# Patient Record
Sex: MALE | Race: BLACK OR AFRICAN AMERICAN | NOT HISPANIC OR LATINO | Employment: OTHER | URBAN - METROPOLITAN AREA
[De-identification: names, ages, dates, MRNs, and addresses within clinical notes are randomized per-mention and may not be internally consistent; named-entity substitution may affect disease eponyms.]

---

## 2023-05-19 ENCOUNTER — TELEPHONE (OUTPATIENT)
Dept: PHYSICAL MEDICINE AND REHAB | Facility: CLINIC | Age: 68
End: 2023-05-19
Payer: COMMERCIAL

## 2023-05-19 NOTE — TELEPHONE ENCOUNTER
[facility] Call Center    Phone Message    May a detailed message be left on voicemail: yes     Reason for Call: Other: pt asking about receiving a procedure with Dr. Mikey Hollis. - Radio Frequency ablation procedure.  Pt has questions re: this procedure. - and will pts insurance would cover procedure.  Pt is scheduled to have nerve block done on 05/23 in [facility] and asking if he should cancel apt - and get the radio frequency ablation done instead.  Pt is having this procedure in [place], [place] with Dr. Chun Crowley.  Pt asking for call back at [phone]      Action Taken: Message routed to:  [facility] ([facility] Dr. Mikey Hollis     Travel Screening: Not Applicable

## 2023-05-19 NOTE — TELEPHONE ENCOUNTER
"Phone call to patient to discuss. Patient reports he has low back issues as well as hip pain. Plan is for him to get a \"nerve block in [place] on Tuesday. Can I get that and still come to your clinic? Is that going to be a problem?\" Explained he could get the nerve block or steroid injection on Tuesday as he may not be jeffery to get an appointment [facility] for 1-2 weeks. Asked that patient have the records and images sent to out clinic. Also asked that he ask for a referral as some insurances require this. Contact information provided to [facility]. Once referral is received, he will be contacted to make his appointment. Encouraged pt to call nurse navigation line if he has not been contacted. Stated understanding and appreciation for call back.     Patient also asked if our clinic accepted Medicare and [employer] insurance. Explained that they have been accepted for other patients. He is encouraged to check with his insurance company too.   "

## 2023-06-14 ENCOUNTER — TRANSCRIBE ORDERS (OUTPATIENT)
Dept: OTHER | Age: 68
End: 2023-06-14

## 2023-06-14 DIAGNOSIS — M54.50 LOW BACK PAIN: Primary | ICD-10-CM
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
## 2023-07-12 PROBLEM — D86.0 SARCOIDOSIS OF LUNG (H): Status: ACTIVE | Noted: 2022-07-05

## 2023-07-12 PROBLEM — Z86.711 HISTORY OF PULMONARY EMBOLISM: Status: ACTIVE | Noted: 2021-06-30

## 2023-07-12 PROBLEM — L30.9 ECZEMA: Status: ACTIVE | Noted: 2018-01-15

## 2023-07-12 PROBLEM — K40.90 RIGHT INGUINAL HERNIA: Status: ACTIVE | Noted: 2023-01-12

## 2023-07-12 PROBLEM — J45.30 MILD PERSISTENT ASTHMA: Status: ACTIVE | Noted: 2022-07-05

## 2023-07-12 PROBLEM — M34.0: Status: ACTIVE | Noted: 2022-11-01

## 2023-07-12 PROBLEM — M25.512 ACUTE PAIN OF LEFT SHOULDER: Status: ACTIVE | Noted: 2020-04-30

## 2023-07-12 PROBLEM — R10.31 RIGHT GROIN PAIN: Status: ACTIVE | Noted: 2023-07-12

## 2023-07-12 PROBLEM — I15.9 SECONDARY HYPERTENSION: Status: ACTIVE | Noted: 2020-04-30

## 2023-07-12 PROBLEM — S86.811A RUPTURE OF RIGHT PATELLAR TENDON: Status: ACTIVE | Noted: 2017-06-08

## 2023-07-12 PROBLEM — M54.9 NOTALGIA: Status: ACTIVE | Noted: 2020-04-30

## 2023-07-12 PROBLEM — Z86.0101 H/O ADENOMATOUS POLYP OF COLON: Status: ACTIVE | Noted: 2020-10-29

## 2023-07-12 PROBLEM — G89.4 CHRONIC PAIN DISORDER: Status: ACTIVE | Noted: 2022-07-05

## 2023-07-12 PROBLEM — M19.049 ARTHRITIS PAIN, HAND: Status: ACTIVE | Noted: 2023-07-12

## 2023-07-12 PROBLEM — M50.30 DISC DISEASE, DEGENERATIVE, CERVICAL: Status: ACTIVE | Noted: 2022-07-05

## 2023-07-12 RX ORDER — VITAMIN B COMPLEX
1 TABLET ORAL DAILY
COMMUNITY

## 2023-07-12 RX ORDER — ZOLPIDEM TARTRATE 5 MG/1
5 TABLET ORAL
COMMUNITY
Start: 2023-01-03

## 2023-07-12 RX ORDER — ACETAMINOPHEN 325 MG/1
325-650 TABLET ORAL EVERY 6 HOURS PRN
COMMUNITY
Start: 2021-11-30

## 2023-07-12 RX ORDER — ALBUTEROL SULFATE 90 UG/1
2 AEROSOL, METERED RESPIRATORY (INHALATION) EVERY 6 HOURS PRN
COMMUNITY
Start: 2023-01-10

## 2023-07-12 RX ORDER — MELOXICAM 15 MG/1
7.5 TABLET ORAL DAILY PRN
Status: ON HOLD | COMMUNITY
Start: 2023-03-15 | End: 2024-01-03

## 2023-07-12 RX ORDER — ASPIRIN 81 MG/1
81 TABLET, CHEWABLE ORAL DAILY
COMMUNITY

## 2023-07-12 RX ORDER — AMLODIPINE BESYLATE 10 MG/1
10 TABLET ORAL
COMMUNITY
Start: 2022-11-01 | End: 2023-12-20

## 2023-07-12 RX ORDER — METOPROLOL TARTRATE 25 MG/1
12.5 TABLET, FILM COATED ORAL AT BEDTIME
COMMUNITY
Start: 2023-04-28

## 2023-07-12 RX ORDER — ATORVASTATIN CALCIUM 80 MG/1
80 TABLET, FILM COATED ORAL AT BEDTIME
COMMUNITY
Start: 2023-01-03

## 2023-07-12 RX ORDER — FLUTICASONE PROPIONATE 44 UG/1
1 AEROSOL, METERED RESPIRATORY (INHALATION) PRN
COMMUNITY
Start: 2021-11-30

## 2023-07-12 RX ORDER — PANTOPRAZOLE SODIUM 40 MG/1
40 TABLET, DELAYED RELEASE ORAL DAILY PRN
COMMUNITY
Start: 2023-01-03

## 2023-07-12 RX ORDER — TRIAMCINOLONE ACETONIDE 1 MG/G
CREAM TOPICAL
COMMUNITY
Start: 2023-06-20 | End: 2023-08-20

## 2023-07-12 RX ORDER — DULOXETIN HYDROCHLORIDE 20 MG/1
20 CAPSULE, DELAYED RELEASE ORAL PRN
COMMUNITY
Start: 2022-12-15

## 2023-07-12 RX ORDER — TRAZODONE HYDROCHLORIDE 100 MG/1
100 TABLET ORAL
COMMUNITY

## 2023-07-13 ENCOUNTER — OFFICE VISIT (OUTPATIENT)
Dept: PHYSICAL MEDICINE AND REHAB | Facility: CLINIC | Age: 68
End: 2023-07-13
Payer: COMMERCIAL

## 2023-07-13 VITALS
DIASTOLIC BLOOD PRESSURE: 78 MMHG | HEIGHT: 72 IN | SYSTOLIC BLOOD PRESSURE: 159 MMHG | WEIGHT: 220 LBS | BODY MASS INDEX: 29.8 KG/M2 | HEART RATE: 53 BPM

## 2023-07-13 DIAGNOSIS — M48.062 SPINAL STENOSIS OF LUMBAR REGION WITH NEUROGENIC CLAUDICATION: Primary | ICD-10-CM

## 2023-07-13 PROCEDURE — 99204 OFFICE O/P NEW MOD 45 MIN: CPT | Performed by: PHYSICIAN ASSISTANT

## 2023-07-13 RX ORDER — GABAPENTIN 300 MG/1
CAPSULE ORAL
Qty: 90 CAPSULE | Refills: 0 | Status: ON HOLD | OUTPATIENT
Start: 2023-07-13 | End: 2023-12-29

## 2023-07-13 ASSESSMENT — PAIN SCALES - GENERAL: PAINLEVEL: SEVERE PAIN (7)

## 2023-07-13 NOTE — PATIENT INSTRUCTIONS
[facility] Scheduling    Please call [phone] to schedule your image(s) (select option#1). There are 2 different locations, see below. You can do walk-in visits for xray only images if you want.     [facility]  [address] [place], [place] [address] [facility] [address]     Prescribed Gabapentin today, 300 mg tablets, to be titrated up to 1 tablets 3 times a day as tolerated for your nerve pain. Please follow Gabapentin dosing chart below.    Gabapentin 300mg Dosing Chart    DATE  MORNING AFTERNOON BEDTIME    Day 1 0 0 1    Day 2 0 0 1    Day 3 0 0 1    Day 4 1 0 1    Day 5 1 0 1    Day 6 1 0 1    Day 7 1 1 1    Day 8 1 1 1    Day 9 1 1 1                                                                                                                                   Continue medication, taking 1 capsules three times daily  Please call if you have any questions regarding how to take your medication
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
## 2023-07-18 ENCOUNTER — TRANSFERRED RECORDS (OUTPATIENT)
Dept: HEALTH INFORMATION MANAGEMENT | Facility: CLINIC | Age: 68
End: 2023-07-18
Payer: COMMERCIAL

## 2023-07-20 ENCOUNTER — TELEPHONE (OUTPATIENT)
Dept: PHYSICAL MEDICINE AND REHAB | Facility: CLINIC | Age: 68
End: 2023-07-20
Payer: COMMERCIAL

## 2023-07-20 NOTE — TELEPHONE ENCOUNTER
Per PSP Priscilla Harmon PA-C: Please call this patient and let him know that I reviewed the report of his MRI lumbar spine.  This shows moderate spinal stenosis at L3-4 and L5-S1.  There is also moderate narrowing around the nerves as they exit out of the spine at both of these levels.  I need to review the images in order to determine the best injection option.  I will have CSS request that the images get sent to me.      Phone call to patient to review results. Left message to return call.

## 2023-07-20 NOTE — TELEPHONE ENCOUNTER
Patient returned call. Results given and explained. Also explained that the PSP only received the report of MRI and is awaiting the actual images. Once she has reviewed the images, she will be able to determine the best injection option for him based on images and symptoms.     Patient did ask about surgery so this can be taken care of. Explained that neurosurgery referral is not made at this time and is usually made if patients are not responsive to conservative treatment or are having red flag symptoms. Stated understanding.

## 2023-07-25 ENCOUNTER — TELEPHONE (OUTPATIENT)
Dept: PHYSICAL MEDICINE AND REHAB | Facility: CLINIC | Age: 68
End: 2023-07-25
Payer: COMMERCIAL

## 2023-07-25 DIAGNOSIS — M54.16 LUMBAR RADICULITIS: Primary | ICD-10-CM

## 2023-07-25 NOTE — TELEPHONE ENCOUNTER
----- Message from Priscilla Harmon PA-C sent at 7/25/2023  8:24 AM CDT -----  Regarding: MRI review  Please call this patient and let him know that I reviewed his MRI lumbar spine.  I recommend bilateral L5-S1 transforaminal epidural steroid injections.  I am also happy to see the patient back in the clinic to review the results in person if he prefers.

## 2023-07-25 NOTE — TELEPHONE ENCOUNTER
Call placed to patient with provider's results and recommendations.  Pt stated understanding. Pt would like to proceed with injections. Injection order placed. Requirements reviewed. Pt on Eliquis. Transferred to scheduling to make appt.

## 2023-08-22 ENCOUNTER — RADIOLOGY INJECTION OFFICE VISIT (OUTPATIENT)
Dept: PHYSICAL MEDICINE AND REHAB | Facility: CLINIC | Age: 68
End: 2023-08-22
Attending: PHYSICIAN ASSISTANT
Payer: COMMERCIAL

## 2023-08-22 VITALS
OXYGEN SATURATION: 97 % | SYSTOLIC BLOOD PRESSURE: 132 MMHG | DIASTOLIC BLOOD PRESSURE: 82 MMHG | RESPIRATION RATE: 16 BRPM | HEART RATE: 58 BPM | TEMPERATURE: 97.8 F

## 2023-08-22 DIAGNOSIS — M54.16 LUMBAR RADICULITIS: ICD-10-CM

## 2023-08-22 PROCEDURE — 64483 NJX AA&/STRD TFRM EPI L/S 1: CPT | Mod: 50 | Performed by: PAIN MEDICINE

## 2023-08-22 RX ORDER — DEXAMETHASONE SODIUM PHOSPHATE 10 MG/ML
INJECTION, SOLUTION INTRAMUSCULAR; INTRAVENOUS
Status: COMPLETED | OUTPATIENT
Start: 2023-08-22 | End: 2023-08-22

## 2023-08-22 RX ORDER — LIDOCAINE HYDROCHLORIDE 10 MG/ML
INJECTION, SOLUTION EPIDURAL; INFILTRATION; INTRACAUDAL; PERINEURAL
Status: COMPLETED | OUTPATIENT
Start: 2023-08-22 | End: 2023-08-22

## 2023-08-22 RX ADMIN — LIDOCAINE HYDROCHLORIDE 4 ML: 10 INJECTION, SOLUTION EPIDURAL; INFILTRATION; INTRACAUDAL; PERINEURAL at 14:03

## 2023-08-22 RX ADMIN — DEXAMETHASONE SODIUM PHOSPHATE 10 MG: 10 INJECTION, SOLUTION INTRAMUSCULAR; INTRAVENOUS at 14:04

## 2023-08-22 ASSESSMENT — PAIN SCALES - GENERAL
PAINLEVEL: MODERATE PAIN (5)
PAINLEVEL: MILD PAIN (2)

## 2023-08-22 NOTE — PATIENT INSTRUCTIONS
Follow-up visit with CHRISTIE Bee in 2 weeks to discuss injection outcome and determine care plan going forward.       DISCHARGE INSTRUCTIONS    During office hours (8:00 a.m.- 4:00 p.m.) questions or concerns may be answered  by calling [facility] Navigation Nurses at  [phone].  Messages received after hours will be returned the following business day.      In the case of an emergency, please dial 911 or seek assistance at the nearest Emergency Room/Urgent Care facility.     All Patients:    You may experience an increase in your symptoms for the first 2 days (It may take anywhere between 2 days- 2 weeks for the steroid to have maximum effect).    You may use ice on the injection site, as frequently as 20 minutes each hour if needed.    You may take your pain medicine.    You may continue taking your regular medication after your injection. If you have had a Medial Branch Block you may resume pain medication once your pain diary is completed.    You may shower. No swimming, tub bath or hot tub for 48 hours.  You may remove your bandaid/bandage as soon as you are home.    You may resume light activities, as tolerated.    Resume your usual diet as tolerated.    It is strongly advised that you do not drive for 1-3 hours post injection.    If you have had oral sedation:  Do not drive for 8 hours post injection.      If you have had IV sedation:  Do not drive for 24 hours post injection.  Do not operate hazardous machinery or make important personal/business decisions for 24 hours.      POSSIBLE STEROID SIDE EFFECTS (If steroid/cortisone was used for your procedure)    -If you experience these symptoms, it should only last for a short period    Swelling of the legs              Skin redness (flushing)     Mouth (oral) irritation   Blood sugar (glucose) levels            Sweats                    Mood changes  Headache  Sleeplessness  Weakened immune system for up to 14 days, which could increase the risk of  kaylie the COVID-19 virus and/or experiencing more severe symptoms of the disease, if exposed.  Decreased effectiveness of the flu vaccine if given within 2 weeks of the steroid.         POSSIBLE PROCEDURE SIDE EFFECTS  -Call the Spine Center if you are concerned  Increased Pain           Increased numbness/tingling      Nausea/Vomiting          Bruising/bleeding at site      Redness or swelling                                              Difficulty walking      Weakness           Fever greater than 100.5    *In the event of a severe headache after an epidural steroid injection that is relieved by lying down, please call the [facility] to speak with a clinical staff member*

## 2023-09-11 ENCOUNTER — TELEPHONE (OUTPATIENT)
Dept: PHYSICAL MEDICINE AND REHAB | Facility: CLINIC | Age: 68
End: 2023-09-11
Payer: COMMERCIAL

## 2023-09-11 DIAGNOSIS — M48.062 SPINAL STENOSIS OF LUMBAR REGION WITH NEUROGENIC CLAUDICATION: Primary | ICD-10-CM

## 2023-09-11 NOTE — TELEPHONE ENCOUNTER
"PSP:  Priscilla Harmon PA-C   Last clinic visit:  7/13/23 OV; 8/22/23 Bilateral L5-S1 TFESIs  Reason for call: want referral to see neurosurgery  Clinical information:  Reports \"Need to have surgery now. The shot didn't do anything for the pinched nerve I got going on. The back feels OK, but I still have the leg symptoms. I can't walk a lot.\"   Advice given to patient: PSP or covering provider would be notified of request. He will be called back with recommended next steps is care. Per patient, detailed message ok upon call back.    Provider to address: Please advise    "

## 2023-09-11 NOTE — TELEPHONE ENCOUNTER
Phone call to patient to discuss. Left detailed message on dedicated voicemail. Encouraged to call the nurse line if he had questions regarding message left.

## 2023-09-11 NOTE — TELEPHONE ENCOUNTER
Please call and let the patient know that I have ordered a Neurosurgery consult for him.  He could also follow up with Priscilla Harmon to see if there are any other conservative measures if he would like.

## 2023-09-15 NOTE — PROGRESS NOTES
Raoul Henry is a 67 year old male who is being evaluated via a billable video visit.      How would you like to obtain your AVS? Mail a copy  Will anyone else be joining your video visit? No      Video Start Time: 8:43 AM  Assessment:   Raoul Henry is a 67 year old y.o. male with past medical history significant for  coronary artery disease, hypertension, asthma, blood clot, anxiety, depression, acid reflux  who presents today for follow-up regarding chronic bilateral low back pain with radiation into the bilateral lower extremities with limited walking tolerance.  My review of an MRI lumbar spine shows grade 1 degenerative spondylolisthesis at L5-S1 where there is moderate spinal stenosis and moderate bilateral foraminal stenosis.  At L3-4 there is a disc bulge and facet hypertrophy with joint effusions causing moderate spinal canal stenosis and moderate bilateral foraminal stenosis.  Patient is following up after bilateral L5-S1 transforaminal epidural steroid injections August 22, 2023 which provided relief of his back pain but no relief of his leg pain.       Plan:     A shared decision making plan was used.  The patient's values and choices were respected.  The following represents what was discussed and decided upon by the physician assistant and the patient.      1.  DIAGNOSTIC TESTS:  - Reviewed the MRI lumbar spine.  - I ordered lumbar spine flexion-extension x-rays.    2.  PHYSICAL THERAPY: Patient to physical therapy in fall 2022. He does his home exercises regularly. No additional physical therapy was ordered.     3.  MEDICATIONS: No changes are made to the patient's medications.  -Gabapentin 300 mg 3 times daily did not provide any relief of his pain so he stopped taking that.  - Patient takes duloxetine 20 mg daily.  - Patient takes Tylenol as needed.  - Patient has meloxicam available but takes it sparingly because it causes fluid retention.    4.  INTERVENTIONS:    - I offered for the patient to  trial bilateral L3-4 transforaminal epidural steroid injections to see if that is more effective than the bilateral L5-S1 transforaminal epidural steroid injections.  Patient declined.  He is interested in finding out if there is a surgical solution to his pain at this point.    5.  REFERRALS: I entered a referral for the patient to see Dr. Meyer with neurosurgery.    6.  FOLLOW-UP: Patient is going to follow-up with me as needed.  We will await recommendations from neurosurgery.  If he has questions or concerns in the meantime, he should not hesitate to call.    Subjective:     Raoul Henry is a 67 year old male who presents today for follow-up regarding chronic low back pain with radiation into the bilateral lower extremities.  Patient is following up after bilateral L5-S1 transforaminal epidural steroid injections August 22, 2023.  Patient reports improvement in his back pain since the injection but no improvement in his leg pain.    Patient complains of right-sided low back pain.  Pain radiates into the right buttock, down the posterior thigh, into the posterior calf.  He has numbness and tingling in the fourth and fifth toes of the right foot.  He feels a weak sensation in his legs.  Pain is aggravated with walking.  He states he has relief with his leg pain if he leans forward when he is walking.  That relief only last about 10 steps before the pain returns.  Pain is alleviated with sitting.  He rates his pain today as a 6 out of 10.  At its worst it is a 7 out of 10.  At its best it is a 5 out of 10.    Treatment to date:  - Physical therapy last fall.  He does his home exercises Regularly  - No chiropractic treatment  - No spine or hip surgeries  - Right hip joint injection about 2 months ago only gave relief for 1 to 2 days and had significant impact on blood sugar  - Bilateral L5-S1 transforaminal epidural steroid injections August 22, 2023 with relief of back pain, but no relief of leg pain  - Tylenol  as needed  - Meloxicam is helpful but causes fluid retention  - Gabapentin 300 mg 3 times daily did not provide any relief       Review of Systems:  Positive for numbness/tingling, weakness, headache.  Negative for loss of bowel/bladder control, inability to urinate, pain much worse at night, trip/stumble/falls, difficulty swallowing, difficulty with hand skills, fevers, unintentional weight loss.     Objective:   CONSTITUTIONAL:  Vital signs as above.  No acute distress.  The patient is well nourished and well groomed.    PSYCHIATRIC:  The patient is awake, alert, oriented to person, place and time.  The patient is answering questions appropriately with clear speech.  Normal affect.  HEENT: Normocephalic, atraumatic.  Sclera clear.    SKIN: Exposed skin is clean, dry, intact without rashes.  MUSCULOSKELETAL: Patient is observed sitting comfortably.     RESULTS: I reviewed the MRI lumbar spine from [facility] dated July 18, 2023.  At L3-4 there is a circumferential disc bulge and left greater than right facet hypertrophy with joint effusions causing moderate spinal canal stenosis and moderate left greater than right foraminal stenosis.  At L4-5 there is a disc bulge and facet arthropathy with mild to moderate spinal canal stenosis, mild right and moderate left foraminal stenosis.  At L5-S1 there is 5 mm degenerative anterolisthesis related to severe facet hypertrophy.  There is moderate bilateral foraminal stenosis.      Video-Visit Details    Type of service:  Video Visit    Video End Time (time video stopped): 8:52 AM  Originating Location (pt. Location): Daughter's house  Provider location: [facility] [address] [address]    Distant Location (provider location):  [facility] [place]     Platform used for Video Visit: Chelsea

## 2023-09-18 ENCOUNTER — VIRTUAL VISIT (OUTPATIENT)
Dept: PHYSICAL MEDICINE AND REHAB | Facility: CLINIC | Age: 68
End: 2023-09-18
Payer: COMMERCIAL

## 2023-09-18 DIAGNOSIS — M43.16 SPONDYLOLISTHESIS OF LUMBAR REGION: Primary | ICD-10-CM

## 2023-09-18 DIAGNOSIS — M48.062 SPINAL STENOSIS OF LUMBAR REGION WITH NEUROGENIC CLAUDICATION: ICD-10-CM

## 2023-09-18 PROCEDURE — 99214 OFFICE O/P EST MOD 30 MIN: CPT | Mod: VID | Performed by: PHYSICIAN ASSISTANT

## 2023-09-18 RX ORDER — TRIAMCINOLONE ACETONIDE 1 MG/G
CREAM TOPICAL 2 TIMES DAILY PRN
COMMUNITY
Start: 2023-08-03

## 2023-09-18 NOTE — PATIENT INSTRUCTIONS
A referral was entered to see neurosurgery at the [facility] Center.  They will call you to schedule an appointment.  Ifyou do not hear from them within 72 hrs, please call [phone] to schedule an appointment.

## 2023-10-02 ENCOUNTER — TELEPHONE (OUTPATIENT)
Dept: NEUROSURGERY | Facility: CLINIC | Age: 68
End: 2023-10-02
Payer: COMMERCIAL

## 2023-10-03 ENCOUNTER — HOSPITAL ENCOUNTER (OUTPATIENT)
Dept: RADIOLOGY | Facility: HOSPITAL | Age: 68
Discharge: HOME | End: 2023-10-03
Attending: SURGERY | Admitting: SURGERY
Payer: COMMERCIAL

## 2023-10-03 ENCOUNTER — OFFICE VISIT (OUTPATIENT)
Dept: NEUROSURGERY | Facility: CLINIC | Age: 68
End: 2023-10-03
Attending: PHYSICIAN ASSISTANT
Payer: COMMERCIAL

## 2023-10-03 VITALS — OXYGEN SATURATION: 96 % | HEART RATE: 72 BPM | SYSTOLIC BLOOD PRESSURE: 120 MMHG | DIASTOLIC BLOOD PRESSURE: 65 MMHG

## 2023-10-03 DIAGNOSIS — M48.062 SPINAL STENOSIS OF LUMBAR REGION WITH NEUROGENIC CLAUDICATION: ICD-10-CM

## 2023-10-03 DIAGNOSIS — M43.16 SPONDYLOLISTHESIS OF LUMBAR REGION: ICD-10-CM

## 2023-10-03 PROCEDURE — 72110 X-RAY EXAM L-2 SPINE 4/>VWS: CPT

## 2023-10-03 PROCEDURE — 99204 OFFICE O/P NEW MOD 45 MIN: CPT | Performed by: SURGERY

## 2023-10-03 ASSESSMENT — PAIN SCALES - GENERAL: PAINLEVEL: SEVERE PAIN (7)

## 2023-10-03 NOTE — PROGRESS NOTES
NEUROSURGERY CONSULTATION NOTE      Neurosurgery was asked to see this patient by Priscilla Harmon PA-C for evaluation of lumbar radiculopathy.       CONSULTATION ASSESSMENT AND PLAN:    66 yo male who presents with low back and bilateral leg pain. MRI of lumbar spine demonstrates moderate stenosis at lumbar 3-4 as well as lumbar 5-sacral 1. He also has moderate foraminal narrowing at lumbar 5-sacral 1 and lumbar 3-4. At lumbar 4-5 he has mild to moderate central stenosis with moderate left foraminal narrowing. In supine position he has 5 mm of anterolisthesis of lumbar 5-sacral 1. He was unable to obtain 4-view xray's prior to today's visit. Would recommend getting those for discussion of surgical options which would depend on the findings of the xray's. We will discuss the results on a telephone visit once those are obtained.      Yaneth Meyer MD      HISTORY OF PRESENTING ILLNESS:  66 yo male who presents with low back and bilateral leg pain. Symptoms present for last 10 years or so. Symptoms are present in posterior legs. Some tingling in his right toes. Legs feel weak. Symptoms can improve for 30 seconds if leans forward. He used to get more improvement with leaning forward. Sitting improves his symptoms. Denies bowel or bladder dysfunction.     Physical therapy- Does not feel he sees benefits. When he does therapy he feels he has a significant decrease in function following.   Recent 70 pounds of weight loss without relief of symptoms  Underwent right hip and bilateral L5-S1 TFESI without long term relief  Gabapentin, tylenol and Meloxicam- no long term relief.     PAST MEDICAL HISTORY:  Coronary artery disease  Hypertension  Asthma  Anxiety and depression  GERD  DVT    PAST SURGICAL HISTORY:  No prior lumbar surgery     REVIEW OF SYSTEMS:  ROS form under media    MEDICATIONS:    Current Outpatient Medications   Medication Sig Dispense Refill    acetaminophen (TYLENOL) 325 MG tablet Take 325-650 mg by  mouth      albuterol (PROAIR HFA/PROVENTIL HFA/VENTOLIN HFA) 108 (90 Base) MCG/ACT inhaler       amLODIPine (NORVASC) 10 MG tablet Take 10 mg by mouth      apixaban ANTICOAGULANT (ELIQUIS ANTICOAGULANT) 5 MG tablet       aspirin (ASA) 81 MG chewable tablet Take 81 mg by mouth      atorvastatin (LIPITOR) 80 MG tablet       diphenhydrAMINE (BENADRYL) 25 MG tablet Take 1 tablet by mouth every 6 hours as needed      DULoxetine (CYMBALTA) 20 MG capsule       fluticasone (FLOVENT HFA) 44 MCG/ACT inhaler   Inhale, bid, 0 Refill(s), Type: Maintenance      gabapentin (NEURONTIN) 300 MG capsule Take 1 capsule (300 mg) by mouth At Bedtime for 3 days, THEN 1 capsule (300 mg) 2 times daily for 3 days, THEN 1 capsule (300 mg) 3 times daily for 84 days. 90 capsule 0    meloxicam (MOBIC) 15 MG tablet Take 7.5 mg by mouth      metoprolol tartrate (LOPRESSOR) 25 MG tablet Take 12.5 mg by mouth      pantoprazole (PROTONIX) 40 MG EC tablet       traZODone (DESYREL) 100 MG tablet Take 100 mg by mouth      triamcinolone (KENALOG) 0.1 % external cream Apply topically 2 times daily      Vitamin D3 (CHOLECALCIFEROL) 25 mcg (1000 units) tablet Take 1 tablet by mouth daily      zolpidem (AMBIEN) 5 MG tablet Take 5 mg by mouth           ALLERGIES/SENSITIVITIES:     Allergies   Allergen Reactions    Amoxicillin Itching    Codeine Hives       PERTINENT SOCIAL HISTORY:     Socioeconomic History    Marital status: Single   Tobacco Use    Smoking status: Former     Types: Cigarettes    Smokeless tobacco: Never         FAMILY HISTORY:  No family history on file.     PHYSICAL EXAM:   Constitutional: /65   Pulse 72   SpO2 96%      Mental Status: A & O in no acute distress.  Affect is appropriate.  Speech is fluent.  Recent and remote memory are intact.  Attention span and concentration are normal.     Motor: Normal bulk and tone all muscle groups of upper and lower extremities.    Strength: 5/5 x 4     Sensory: Sensation intact bilaterally to  light touch.     Coordination: antalgic gait. Tatianna forward    Reflexes; supinator, biceps, triceps, knee/ ankle jerk intact 1+. No smiley's    IMAGING:  I personally reviewed all radiographic images         Cc:   No primary care provider on file.

## 2023-10-05 ENCOUNTER — VIRTUAL VISIT (OUTPATIENT)
Dept: NEUROSURGERY | Facility: CLINIC | Age: 68
End: 2023-10-05
Payer: COMMERCIAL

## 2023-10-05 DIAGNOSIS — M43.16 SPONDYLOLISTHESIS OF LUMBAR REGION: Primary | ICD-10-CM

## 2023-10-05 PROCEDURE — 99207 PR NO CHARGE LOS: CPT | Performed by: SURGERY

## 2023-10-05 NOTE — PROGRESS NOTES
68 yo male who presents with low back and bilateral leg pain. MRI of lumbar spine demonstrates moderate stenosis at lumbar 3-4 as well as lumbar 5-sacral 1. He also has moderate foraminal narrowing at lumbar 5-sacral 1 and lumbar 3-4. At lumbar 4-5 he has mild to moderate central stenosis with moderate left foraminal narrowing. In supine position he has 5 mm of anterolisthesis of lumbar 5-sacral 1. He was unable to obtain 4-view xray's prior to today's visit. Would recommend getting those for discussion of surgical options which would depend on the findings of the xray's. We will discuss the results on a telephone visit once those are obtained.      In the upright position on x-ray, patient develops anterolisthesis of both lumbar 3-4 and lumbar 4-5.  He continues to have grade 1 anterolisthesis at lumbar 5-sacral 1.  Although there does not appear to be dynamic instability between flexion-extension while upright since he develops anterolisthesis between the supine and upright position that suggests some segmental instability at lumbar 3-4 lumbar 4-5.  His symptoms are primarily claudicating in nature and likely due to his central stenosis at lumbar 3-4 lumbar 5-sacral 1.  Given these dynamic changes from supine to standing position would recommended instrumented fusion in addition to decompression of these levels.  Patient has a history of multiple chronic compression deformities.  No prior DEXA scan to assess bone quality.  Recommend proceeding with DEXA to rule out osteoporosis prior to proceeding with surgery.  He will follow-up after his DEXA scan.    Yaneth Meyer MD

## 2023-10-06 ENCOUNTER — TELEPHONE (OUTPATIENT)
Dept: NEUROSURGERY | Facility: CLINIC | Age: 68
End: 2023-10-06
Payer: COMMERCIAL

## 2023-10-06 NOTE — TELEPHONE ENCOUNTER
Health Call Center    Phone Message    May a detailed message be left on voicemail: yes     Reason for Call: Order(s): Other:   Reason for requested: Bone density to [employer]. [employer]'s phone# [phone]  Date needed: asap  Provider name: Yaneth Meyer    Please call patient back once done.    Action Taken: Other: [facility]    Travel Screening: Not Applicable

## 2023-10-09 ENCOUNTER — TELEPHONE (OUTPATIENT)
Dept: NEUROSURGERY | Facility: CLINIC | Age: 68
End: 2023-10-09
Payer: COMMERCIAL

## 2023-10-09 NOTE — TELEPHONE ENCOUNTER
[facility] Call Center    Phone Message    May a detailed message be left on voicemail: no     Reason for Call: [facility] didn't receive the order for dexa scan. Fax again to [phone]. Direct number to confirm they received it is [phone]  Patient would like c/b when done

## 2023-10-09 NOTE — TELEPHONE ENCOUNTER
Called number, no answer, [facility] imaging voicemail. Called hospital  and was given imaging department fax number.    Faxed  DEXA scan order  October 9, 2023 to fax number [phone]    Right Fax confirmed at 8:46 AM    Uzair Arora RN

## 2023-10-26 ENCOUNTER — MYC MEDICAL ADVICE (OUTPATIENT)
Dept: NEUROSURGERY | Facility: CLINIC | Age: 68
End: 2023-10-26
Payer: COMMERCIAL

## 2023-11-02 ENCOUNTER — PREP FOR PROCEDURE (OUTPATIENT)
Dept: NEUROSURGERY | Facility: CLINIC | Age: 68
End: 2023-11-02

## 2023-11-02 ENCOUNTER — VIRTUAL VISIT (OUTPATIENT)
Dept: NEUROSURGERY | Facility: CLINIC | Age: 68
End: 2023-11-02
Payer: COMMERCIAL

## 2023-11-02 DIAGNOSIS — M43.16 SPONDYLOLISTHESIS OF LUMBAR REGION: ICD-10-CM

## 2023-11-02 DIAGNOSIS — M53.2X6 LUMBAR SPINE INSTABILITY: Primary | ICD-10-CM

## 2023-11-02 DIAGNOSIS — M43.16 SPONDYLOLISTHESIS OF LUMBAR REGION: Primary | ICD-10-CM

## 2023-11-02 DIAGNOSIS — M53.2X6 SPINAL INSTABILITY OF LUMBAR REGION: ICD-10-CM

## 2023-11-02 DIAGNOSIS — M54.16 LUMBAR RADICULOPATHY: ICD-10-CM

## 2023-11-02 PROCEDURE — 99214 OFFICE O/P EST MOD 30 MIN: CPT | Performed by: SURGERY

## 2023-11-02 NOTE — PROGRESS NOTES
NEUROSURGERY FOLLOW UP  NOTE    Raoul Henry comes today in follow-up.  66 yo male who presents with low back and bilateral leg pain. MRI of lumbar spine demonstrates moderate stenosis at lumbar 3-4 as well as lumbar 5-sacral 1. He also has moderate foraminal narrowing at lumbar 5-sacral 1 and lumbar 3-4. At lumbar 4-5 he has mild to moderate central stenosis with moderate left foraminal narrowing. In supine position he has 5 mm of anterolisthesis of lumbar 5-sacral 1. He was unable to obtain 4-view xray's prior to today's visit. Would recommend getting those for discussion of surgical options which would depend on the findings of the xray's. We will discuss the results on a telephone visit once those are obtained.       In the upright position on x-ray, patient develops anterolisthesis of both lumbar 3-4 and lumbar 4-5.  He continues to have grade 1 anterolisthesis at lumbar 5-sacral 1.  Although there does not appear to be dynamic instability between flexion-extension while upright since he develops anterolisthesis between the supine and upright position that suggests some segmental instability at lumbar 3-4 lumbar 4-5. This was confirmed with radiology.  His symptoms are primarily claudicating in nature and likely due to his central stenosis at lumbar 3-4 lumbar 5-sacral 1.  Given these dynamic changes from supine to standing position would recommended instrumented fusion in addition to decompression of these levels.  Patient has a history of multiple chronic compression deformities.  No prior DEXA scan to assess bone quality.  Recommend proceeding with DEXA to rule out osteoporosis prior to proceeding with surgery.  He will follow-up after his DEXA scan.    DEXA showed osteopenia. Given multiple levels of dynamic instabilities in setting of central stenosis with foraminal narrowing, recommend lumbar 3-lumbar 4, lumbar 4-lumbar 5 and lumbar 5 sacral 1 anterior lumbar interbody fusion and posterior minimally  invasive instrumented fusion with use of stealth.  Risks and benefits were discussed in detail including but not limited to infection, hematoma, nerve damage including paralysis, post op radiculitis, durotomy, lack of a sold bone fusion, hardware malfunction, risks associated with the use of general anesthesia, blood clots in the lungs or legs. All questions answered and he agreed to proceed.    Yaneth Meyer MD      CC:     No primary care provider on file.  No primary provider on file.

## 2023-11-03 ENCOUNTER — TELEPHONE (OUTPATIENT)
Dept: NEUROSURGERY | Facility: CLINIC | Age: 68
End: 2023-11-03
Payer: COMMERCIAL

## 2023-11-03 NOTE — TELEPHONE ENCOUNTER
"Spoke to the patient, and offered to schedule surgery. After letting the patient know that he would need to call his insurance company and ask if  is covered under his insurance plan, the patient became hostile and told me that this part of the process is my job and continued to ask me \"are we clear\" and I responded \"no we are not clear this is part of the process we need to go through before we can start talking about dates\". I then offered to help and call the patient's insurance company for him and I asked \"do you happen to have your insurance card with you?' I was going to ask him for his customer care number, and call and figure out the  part of the process for him. I did see the provider number in his chart, but in the past when calling the provider number for [facility] I either was transfered to customer care or told to call and ask them.  The patient then said \"no the next person that calls me needs to be calling me with dates to schedule this surgery\". After that the patient hung up the phone.   "

## 2023-11-03 NOTE — TELEPHONE ENCOUNTER
Left a voice message with 's surgery scheduler Rebekah asking her to give me a call back to discuss surgery dates for the patient. Patient left a voice message on 11/3/23 at 9:42p.m asking for a call back to schedule surgery.

## 2023-11-06 NOTE — TELEPHONE ENCOUNTER
Spoke to the patient and let him know that I have left a voice message with 's office to return my call to coordinate dates for surgery, and that I would call him back after I speak with them. The patient verbalized understanding.

## 2023-11-06 NOTE — TELEPHONE ENCOUNTER
Left a voice message for Adeline Tyson's surgery scheduler to return the call to discuss coordinating the patient's surgery, Adeline is out until 11/7/23, and left her supervisor Judi's name and number on her voicemail. Left a voice mail for Judi to return the call to coordinate surgery for the patient.

## 2023-11-07 NOTE — TELEPHONE ENCOUNTER
Spoke to the patient and let him know that  isn't available until next year, and that I should have an answer about another provider assisting with the surgery by tomorrow 11/8/23.

## 2023-11-09 NOTE — TELEPHONE ENCOUNTER
Staff message from  asks for me to try scheduling with  a week before or the week after matti. Left a voice message with 's surgery scheduler Adeline to give me a call back. Left a voice message updating the patient as well.    No

## 2023-11-13 DIAGNOSIS — Z01.818 PREOP TESTING: Primary | ICD-10-CM

## 2023-11-13 NOTE — TELEPHONE ENCOUNTER
Patient is scheduled for surgery on 12/29/23. I gave the patient surgery details, and he verbalized understanding.

## 2023-12-20 ENCOUNTER — OFFICE VISIT (OUTPATIENT)
Dept: FAMILY MEDICINE | Facility: CLINIC | Age: 68
End: 2023-12-20
Payer: COMMERCIAL

## 2023-12-20 VITALS
WEIGHT: 208 LBS | BODY MASS INDEX: 28.21 KG/M2 | OXYGEN SATURATION: 98 % | HEART RATE: 60 BPM | DIASTOLIC BLOOD PRESSURE: 72 MMHG | RESPIRATION RATE: 20 BRPM | TEMPERATURE: 98.4 F | SYSTOLIC BLOOD PRESSURE: 130 MMHG

## 2023-12-20 DIAGNOSIS — Z01.818 PREOP TESTING: ICD-10-CM

## 2023-12-20 DIAGNOSIS — M43.16 SPONDYLOLISTHESIS OF LUMBAR REGION: ICD-10-CM

## 2023-12-20 DIAGNOSIS — Z86.711 HISTORY OF PULMONARY EMBOLISM: ICD-10-CM

## 2023-12-20 DIAGNOSIS — Z01.818 PREOP GENERAL PHYSICAL EXAM: Primary | ICD-10-CM

## 2023-12-20 DIAGNOSIS — I25.2 HISTORY OF MI (MYOCARDIAL INFARCTION): ICD-10-CM

## 2023-12-20 LAB
ANION GAP SERPL CALCULATED.3IONS-SCNC: 13 MMOL/L (ref 7–15)
APTT PPP: 34 SECONDS (ref 22–38)
BASOPHILS # BLD AUTO: 0 10E3/UL (ref 0–0.2)
BASOPHILS NFR BLD AUTO: 1 %
BUN SERPL-MCNC: 19.2 MG/DL (ref 8–23)
CALCIUM SERPL-MCNC: 10.5 MG/DL (ref 8.8–10.2)
CHLORIDE SERPL-SCNC: 102 MMOL/L (ref 98–107)
CREAT SERPL-MCNC: 1.11 MG/DL (ref 0.67–1.17)
DEPRECATED HCO3 PLAS-SCNC: 23 MMOL/L (ref 22–29)
EGFRCR SERPLBLD CKD-EPI 2021: 73 ML/MIN/1.73M2
EOSINOPHIL # BLD AUTO: 0.2 10E3/UL (ref 0–0.7)
EOSINOPHIL NFR BLD AUTO: 3 %
ERYTHROCYTE [DISTWIDTH] IN BLOOD BY AUTOMATED COUNT: 14.2 % (ref 10–15)
GLUCOSE SERPL-MCNC: 89 MG/DL (ref 70–99)
HCT VFR BLD AUTO: 42.1 % (ref 40–53)
HGB BLD-MCNC: 14.5 G/DL (ref 13.3–17.7)
IMM GRANULOCYTES # BLD: 0 10E3/UL
IMM GRANULOCYTES NFR BLD: 0 %
INR PPP: 1.24 (ref 0.85–1.15)
LYMPHOCYTES # BLD AUTO: 1.6 10E3/UL (ref 0.8–5.3)
LYMPHOCYTES NFR BLD AUTO: 31 %
MCH RBC QN AUTO: 30.9 PG (ref 26.5–33)
MCHC RBC AUTO-ENTMCNC: 34.4 G/DL (ref 31.5–36.5)
MCV RBC AUTO: 90 FL (ref 78–100)
MONOCYTES # BLD AUTO: 0.6 10E3/UL (ref 0–1.3)
MONOCYTES NFR BLD AUTO: 11 %
NEUTROPHILS # BLD AUTO: 2.8 10E3/UL (ref 1.6–8.3)
NEUTROPHILS NFR BLD AUTO: 54 %
PLATELET # BLD AUTO: 219 10E3/UL (ref 150–450)
POTASSIUM SERPL-SCNC: 3.8 MMOL/L (ref 3.4–5.3)
RBC # BLD AUTO: 4.7 10E6/UL (ref 4.4–5.9)
SODIUM SERPL-SCNC: 138 MMOL/L (ref 135–145)
VIT D+METAB SERPL-MCNC: 64 NG/ML (ref 20–50)
WBC # BLD AUTO: 5.3 10E3/UL (ref 4–11)

## 2023-12-20 PROCEDURE — 36415 COLL VENOUS BLD VENIPUNCTURE: CPT

## 2023-12-20 PROCEDURE — 93005 ELECTROCARDIOGRAM TRACING: CPT

## 2023-12-20 PROCEDURE — 80048 BASIC METABOLIC PNL TOTAL CA: CPT

## 2023-12-20 PROCEDURE — 85610 PROTHROMBIN TIME: CPT

## 2023-12-20 PROCEDURE — 82306 VITAMIN D 25 HYDROXY: CPT

## 2023-12-20 PROCEDURE — 85730 THROMBOPLASTIN TIME PARTIAL: CPT

## 2023-12-20 PROCEDURE — 99204 OFFICE O/P NEW MOD 45 MIN: CPT | Mod: 25

## 2023-12-20 PROCEDURE — 85025 COMPLETE CBC W/AUTO DIFF WBC: CPT

## 2023-12-20 RX ORDER — CLOBETASOL PROPIONATE 0.5 MG/G
OINTMENT TOPICAL PRN
COMMUNITY
Start: 2023-11-08

## 2023-12-20 ASSESSMENT — ASTHMA QUESTIONNAIRES: ACT_TOTALSCORE: 25

## 2023-12-20 ASSESSMENT — PAIN SCALES - GENERAL: PAINLEVEL: NO PAIN (0)

## 2023-12-20 NOTE — COMMUNITY RESOURCES LIST (ENGLISH)
12/20/2023   [facility] [employer]  N/A  For questions about this resource list or additional care needs, please contact your primary care clinic or care manager.  Phone: [phone]   Email: N/A   Address: [address] [place] [id number]   Hours: N/A        Financial Stability       Utility payment assistance  1  [employer] Distance: 0.19 miles      Phone/Virtual   [address] [place] [address]  Language: English  Hours: Mon - Fri 10:00 AM - 1:00 PM , Thu 5:00 PM - 6:30 PM  Fees: Free   Phone: (228) 557-4281 Email: Top[employer].source@[employer] Website: http://www.[employer].org     2  [employer] Distance: 5.43 miles      [address] [place] [address]  Language: English, Korean  Hours: Mon 9:30 AM - 11:30 AM , Tue 1:30 PM - 7:30 PM , Thu 9:00 AM - 7:00 PM , Fri 9:30 AM - 11:30 AM   Phone: (712) 136-1535 Email: info@[employer] Website: [email or web address].[email or web address]/          Important Numbers & Websites       Emergency Services   911  City Services   311  Poison Control   (216) 485-6438  Suicide Prevention Lifeline   (439) 640-9586 (TALK)  Child Abuse Hotline   (210) 410-8556 (4-A-Child)  Sexual Assault Hotline   (777) 145-8775 (HOPE)  National Runaway Safeline   (954) 743-2053 (RUNAWAY)  All-Options Talkline   (317) 947-2282  Substance Abuse Referral   (704) 718-2314 (HELP)

## 2023-12-20 NOTE — PATIENT INSTRUCTIONS
Preparing for Your Surgery  Getting started  A nurse will call you to review your health history and instructions. They will give you an arrival time based on your scheduled surgery time. Please be ready to share:  Your doctor's clinic name and phone number  Your medical, surgical, and anesthesia history  A list of allergies and sensitivities  A list of medicines, including herbal treatments and over-the-counter drugs  Whether the patient has a legal guardian (ask how to send us the papers in advance)  Please tell us if you're pregnant--or if there's any chance you might be pregnant. Some surgeries may injure a fetus (unborn baby), so they require a pregnancy test. Surgeries that are safe for a fetus don't always need a test, and you can choose whether to have one.   If you have a child who's having surgery, please ask for a copy of Preparing for Your Child's Surgery.    Preparing for surgery  Within 10 to 30 days of surgery: Have a pre-op exam (sometimes called an H&P, or History and Physical). This can be done at a clinic or pre-operative center.  If you're having a , you may not need this exam. Talk to your care team.  At your pre-op exam, talk to your care team about all medicines you take. If you need to stop any medicines before surgery, ask when to start taking them again.  We do this for your safety. Many medicines can make you bleed too much during surgery. Some change how well surgery (anesthesia) drugs work.  Call your insurance company to let them know you're having surgery. (If you don't have insurance, call [phone].)  Call your clinic if there's any change in your health. This includes signs of a cold or flu (sore throat, runny nose, cough, rash, fever). It also includes a scrape or scratch near the surgery site.  If you have questions on the day of surgery, call your hospital or surgery center.  Eating and drinking guidelines  For your safety: Unless your surgeon tells you otherwise,  follow the guidelines below.  Eat and drink as usual until 8 hours before you arrive for surgery. After that, no food or milk.  Drink clear liquids until 2 hours before you arrive. These are liquids you can see through, like water, Gatorade, and Propel Water. They also include plain black coffee and tea (no cream or milk), candy, and breath mints. You can spit out gum when you arrive.  If you drink alcohol: Stop drinking it the night before surgery.  If your care team tells you to take medicine on the morning of surgery, it's okay to take it with a sip of water.  Preventing infection  Shower or bathe the night before and morning of your surgery. Follow the instructions your clinic gave you. (If no instructions, use regular soap.)  Don't shave or clip hair near your surgery site. We'll remove the hair if needed.  Don't smoke or vape the morning of surgery. You may chew nicotine gum up to 2 hours before surgery. A nicotine patch is okay.  Note: Some surgeries require you to completely quit smoking and nicotine. Check with your surgeon.  Your care team will make every effort to keep you safe from infection. We will:  Clean our hands often with soap and water (or an alcohol-based hand rub).  Clean the skin at your surgery site with a special soap that kills germs.  Give you a special gown to keep you warm. (Cold raises the risk of infection.)  Wear special hair covers, masks, gowns and gloves during surgery.  Give antibiotic medicine, if prescribed. Not all surgeries need antibiotics.  What to bring on the day of surgery  Photo ID and insurance card  Copy of your health care directive, if you have one  Glasses and hearing aids (bring cases)  You can't wear contacts during surgery  Inhaler and eye drops, if you use them (tell us about these when you arrive)  CPAP machine or breathing device, if you use them  A few personal items, if spending the night  If you have . . .  A pacemaker, ICD (cardiac defibrillator) or other  implant: Bring the ID card.  An implanted stimulator: Bring the remote control.  A legal guardian: Bring a copy of the certified (court-stamped) guardianship papers.  Please remove any jewelry, including body piercings. Leave jewelry and other valuables at home.  If you're going home the day of surgery  You must have a responsible adult drive you home. They should stay with you overnight as well.  If you don't have someone to stay with you, and you aren't safe to go home alone, we may keep you overnight. Insurance often won't pay for this.  After surgery  If it's hard to control your pain or you need more pain medicine, please call your surgeon's office.  Questions?   If you have any questions for your care team, list them here: _________________________________________________________________________________________________________________________________________________________________________ ____________________________________ ____________________________________ ____________________________________  For informational purposes only. Not to replace the advice of your health care provider. Copyright   2003, 2019 [employer]). All rights reserved. Clinically reviewed by Yuko Burrows MD. [employer] 150280 - REV 12/22.    How to Take Your Medication Before Surgery    Antiplatelet or Anticoagulation Medication Instructions:   - aspirin: Discontinue aspirin 7-10 days prior to procedure to reduce bleeding risk. It should be resumed postoperatively. (STOP ON 12/22/23)    - apixaban (Eliquis), edoxaban (Savaysa), rivaroxaban (Xarelto): Bleeding risk is moderate or high for this procedure AND CrCl  (>=/) 50 mL/min. HOLD 2 days before surgery. (Stop on 12/27)    Additional Medication Instructions:   - Metoprolol: Continue taking on the day of surgery with a small sip of water.    - Atorvastatin: Continue taking the night before surgery.    - Continue your inhalers  - Ambien, Trazodone, pantoprazole, gabapentin,  Cymbalta you can continue.   - Aspirin, Vitamin D, meloxicam (Mobic), benadryl and over the counter vitamins or medications should also be stopped 12/22/2023

## 2023-12-20 NOTE — COMMUNITY RESOURCES LIST (ENGLISH)
12/20/2023   [facility] [employer]  N/A  For questions about this resource list or additional care needs, please contact your primary care clinic or care manager.  Phone: [phone]   Email: N/A   Address: [address] [place] [id number]   Hours: N/A        Financial Stability       Utility payment assistance  1  [employer] Distance: 0.19 miles      Phone/Virtual   [address] [place] [address]  Language: English  Hours: Mon - Fri 10:00 AM - 1:00 PM , Thu 5:00 PM - 6:30 PM  Fees: Free   Phone: (354) 226-9329 Email: [employer].source@[employer] Website: http://www.[employer].org     2  [employer] Distance: 5.43 miles      [address] [place] [address]  Language: English, Portuguese  Hours: Mon 9:30 AM - 11:30 AM , Tue 1:30 PM - 7:30 PM , Thu 9:00 AM - 7:00 PM , Fri 9:30 AM - 11:30 AM   Phone: (457) 317-5443 Email: info@[employer] Website: [email or web address]          Important Numbers & Websites       Emergency Services   911  City [facility]   311  Poison Control   (588) 203-4515  Suicide Prevention Lifeline   (802) 556-2938 (TALK)  Child Abuse Hotline   (154) 892-7474 (4-A-Child)  Sexual Assault Hotline   (681) 509-1432 (HOPE)  National Runaway Safeline   (326) 461-5329 (RUNAWAY)  All-Options Talkline   (143) 313-5083  Substance Abuse Referral   (449) 863-7129 (HELP)
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
## 2023-12-21 LAB
ATRIAL RATE - MUSE: 73 BPM
DIASTOLIC BLOOD PRESSURE - MUSE: NORMAL MMHG
INTERPRETATION ECG - MUSE: NORMAL
P AXIS - MUSE: -2 DEGREES
PR INTERVAL - MUSE: 150 MS
QRS DURATION - MUSE: 80 MS
QT - MUSE: 422 MS
QTC - MUSE: 464 MS
R AXIS - MUSE: -13 DEGREES
SYSTOLIC BLOOD PRESSURE - MUSE: NORMAL MMHG
T AXIS - MUSE: 3 DEGREES
VENTRICULAR RATE- MUSE: 73 BPM

## 2023-12-21 PROCEDURE — 93010 ELECTROCARDIOGRAM REPORT: CPT | Performed by: INTERNAL MEDICINE

## 2023-12-23 ENCOUNTER — HEALTH MAINTENANCE LETTER (OUTPATIENT)
Age: 68
End: 2023-12-23

## 2023-12-28 ENCOUNTER — ANESTHESIA EVENT (OUTPATIENT)
Dept: SURGERY | Facility: CLINIC | Age: 68
DRG: 460 | End: 2023-12-28
Payer: COMMERCIAL

## 2023-12-28 NOTE — TELEPHONE ENCOUNTER
LMTCB.      Reviewed H&P from 12/20/2023 - cleared for surgery  Labs, EKG - WNL (elevated INR, Dr. Meyer is aware)    MRI done on 07/18/2023 - in [place]    To OR as planned.       Sandra Thorpe, RN, CNRN

## 2023-12-29 ENCOUNTER — APPOINTMENT (OUTPATIENT)
Dept: SURGERY | Facility: PHYSICIAN GROUP | Age: 68
End: 2023-12-29
Payer: COMMERCIAL

## 2023-12-29 ENCOUNTER — HOSPITAL ENCOUNTER (INPATIENT)
Facility: CLINIC | Age: 68
LOS: 5 days | Discharge: HOME | DRG: 460 | End: 2024-01-03
Attending: SURGERY | Admitting: SURGERY
Payer: COMMERCIAL

## 2023-12-29 ENCOUNTER — APPOINTMENT (OUTPATIENT)
Dept: RADIOLOGY | Facility: CLINIC | Age: 68
DRG: 460 | End: 2023-12-29
Attending: PHYSICIAN ASSISTANT
Payer: COMMERCIAL

## 2023-12-29 ENCOUNTER — APPOINTMENT (OUTPATIENT)
Dept: RADIOLOGY | Facility: CLINIC | Age: 68
DRG: 460 | End: 2023-12-29
Attending: SURGERY
Payer: COMMERCIAL

## 2023-12-29 ENCOUNTER — ANESTHESIA (OUTPATIENT)
Dept: SURGERY | Facility: CLINIC | Age: 68
DRG: 460 | End: 2023-12-29
Payer: COMMERCIAL

## 2023-12-29 DIAGNOSIS — Z98.1 S/P LUMBAR FUSION: Primary | ICD-10-CM

## 2023-12-29 DIAGNOSIS — T83.511A URINARY TRACT INFECTION ASSOCIATED WITH INDWELLING URETHRAL CATHETER, INITIAL ENCOUNTER (H): ICD-10-CM

## 2023-12-29 DIAGNOSIS — N39.0 URINARY TRACT INFECTION ASSOCIATED WITH INDWELLING URETHRAL CATHETER, INITIAL ENCOUNTER (H): ICD-10-CM

## 2023-12-29 LAB
ABO/RH(D): NORMAL
ANTIBODY SCREEN: NEGATIVE
BLD PROD TYP BPU: NORMAL
BLD PROD TYP BPU: NORMAL
BLOOD COMPONENT TYPE: NORMAL
BLOOD COMPONENT TYPE: NORMAL
CODING SYSTEM: NORMAL
CODING SYSTEM: NORMAL
CROSSMATCH: NORMAL
CROSSMATCH: NORMAL
ERYTHROCYTE [DISTWIDTH] IN BLOOD BY AUTOMATED COUNT: 14 % (ref 10–15)
ERYTHROCYTE [DISTWIDTH] IN BLOOD BY AUTOMATED COUNT: 14.1 % (ref 10–15)
GLUCOSE BLDC GLUCOMTR-MCNC: 91 MG/DL (ref 70–99)
HCT VFR BLD AUTO: 32.9 % (ref 40–53)
HCT VFR BLD AUTO: 37.6 % (ref 40–53)
HGB BLD-MCNC: 11 G/DL (ref 13.3–17.7)
HGB BLD-MCNC: 12.6 G/DL (ref 13.3–17.7)
MCH RBC QN AUTO: 30 PG (ref 26.5–33)
MCH RBC QN AUTO: 30 PG (ref 26.5–33)
MCHC RBC AUTO-ENTMCNC: 33.4 G/DL (ref 31.5–36.5)
MCHC RBC AUTO-ENTMCNC: 33.5 G/DL (ref 31.5–36.5)
MCV RBC AUTO: 90 FL (ref 78–100)
MCV RBC AUTO: 90 FL (ref 78–100)
PLATELET # BLD AUTO: 155 10E3/UL (ref 150–450)
PLATELET # BLD AUTO: 195 10E3/UL (ref 150–450)
RBC # BLD AUTO: 3.67 10E6/UL (ref 4.4–5.9)
RBC # BLD AUTO: 4.2 10E6/UL (ref 4.4–5.9)
SPECIMEN EXPIRATION DATE: NORMAL
UNIT ABO/RH: NORMAL
UNIT ABO/RH: NORMAL
UNIT NUMBER: NORMAL
UNIT NUMBER: NORMAL
UNIT STATUS: NORMAL
UNIT STATUS: NORMAL
UNIT TYPE ISBT: 6200
UNIT TYPE ISBT: 6200
WBC # BLD AUTO: 8.9 10E3/UL (ref 4–11)
WBC # BLD AUTO: 9.5 10E3/UL (ref 4–11)

## 2023-12-29 PROCEDURE — L8699 PROSTHETIC IMPLANT NOS: HCPCS | Performed by: SURGERY

## 2023-12-29 PROCEDURE — 250N000025 HC SEVOFLURANE, PER MIN: Performed by: SURGERY

## 2023-12-29 PROCEDURE — 258N000003 HC RX IP 258 OP 636: Performed by: NURSE ANESTHETIST, CERTIFIED REGISTERED

## 2023-12-29 PROCEDURE — 86923 COMPATIBILITY TEST ELECTRIC: CPT | Performed by: ANESTHESIOLOGY

## 2023-12-29 PROCEDURE — 999N000063 XR ABDOMEN PORT 1 VIEW

## 2023-12-29 PROCEDURE — 85014 HEMATOCRIT: CPT | Performed by: SURGERY

## 2023-12-29 PROCEDURE — C1762 CONN TISS, HUMAN(INC FASCIA): HCPCS | Performed by: SURGERY

## 2023-12-29 PROCEDURE — P9045 ALBUMIN (HUMAN), 5%, 250 ML: HCPCS | Mod: JZ | Performed by: NURSE ANESTHETIST, CERTIFIED REGISTERED

## 2023-12-29 PROCEDURE — C1713 ANCHOR/SCREW BN/BN,TIS/BN: HCPCS | Performed by: SURGERY

## 2023-12-29 PROCEDURE — 250N000009 HC RX 250: Performed by: NURSE ANESTHETIST, CERTIFIED REGISTERED

## 2023-12-29 PROCEDURE — 0QH004Z INSERTION OF INTERNAL FIXATION DEVICE INTO LUMBAR VERTEBRA, OPEN APPROACH: ICD-10-PCS | Performed by: SURGERY

## 2023-12-29 PROCEDURE — 360N000086 HC SURGERY LEVEL 6 W/ FLUORO, PER MIN: Performed by: SURGERY

## 2023-12-29 PROCEDURE — 250N000011 HC RX IP 250 OP 636: Performed by: ANESTHESIOLOGY

## 2023-12-29 PROCEDURE — 258N000003 HC RX IP 258 OP 636: Performed by: SURGERY

## 2023-12-29 PROCEDURE — 250N000013 HC RX MED GY IP 250 OP 250 PS 637: Performed by: ANESTHESIOLOGY

## 2023-12-29 PROCEDURE — 250N000011 HC RX IP 250 OP 636: Performed by: NURSE ANESTHETIST, CERTIFIED REGISTERED

## 2023-12-29 PROCEDURE — 85027 COMPLETE CBC AUTOMATED: CPT

## 2023-12-29 PROCEDURE — 99222 1ST HOSP IP/OBS MODERATE 55: CPT | Performed by: STUDENT IN AN ORGANIZED HEALTH CARE EDUCATION/TRAINING PROGRAM

## 2023-12-29 PROCEDURE — 0SB20ZZ EXCISION OF LUMBAR VERTEBRAL DISC, OPEN APPROACH: ICD-10-PCS | Performed by: SURGERY

## 2023-12-29 PROCEDURE — 272N000001 HC OR GENERAL SUPPLY STERILE: Performed by: SURGERY

## 2023-12-29 PROCEDURE — 999N000182 XR SURGERY CARM FLUORO GREATER THAN 5 MIN

## 2023-12-29 PROCEDURE — 120N000001 HC R&B MED SURG/OB

## 2023-12-29 PROCEDURE — 710N000010 HC RECOVERY PHASE 1, LEVEL 2, PER MIN: Performed by: SURGERY

## 2023-12-29 PROCEDURE — 258N000003 HC RX IP 258 OP 636: Performed by: ANESTHESIOLOGY

## 2023-12-29 PROCEDURE — 999N000182 XR SURGERY CARM FLUORO GREATER THAN 5 MIN: Mod: TC

## 2023-12-29 PROCEDURE — 0SG30A0 FUSION OF LUMBOSACRAL JOINT WITH INTERBODY FUSION DEVICE, ANTERIOR APPROACH, ANTERIOR COLUMN, OPEN APPROACH: ICD-10-PCS | Performed by: SURGERY

## 2023-12-29 PROCEDURE — 250N000011 HC RX IP 250 OP 636: Performed by: PHYSICIAN ASSISTANT

## 2023-12-29 PROCEDURE — 0QH104Z INSERTION OF INTERNAL FIXATION DEVICE INTO SACRUM, OPEN APPROACH: ICD-10-PCS | Performed by: SURGERY

## 2023-12-29 PROCEDURE — 999N000141 HC STATISTIC PRE-PROCEDURE NURSING ASSESSMENT: Performed by: SURGERY

## 2023-12-29 PROCEDURE — C1760 CLOSURE DEV, VASC: HCPCS | Performed by: SURGERY

## 2023-12-29 PROCEDURE — 250N000013 HC RX MED GY IP 250 OP 250 PS 637: Performed by: PHYSICIAN ASSISTANT

## 2023-12-29 PROCEDURE — 370N000017 HC ANESTHESIA TECHNICAL FEE, PER MIN: Performed by: SURGERY

## 2023-12-29 PROCEDURE — 258N000003 HC RX IP 258 OP 636

## 2023-12-29 PROCEDURE — 0W3H0ZZ CONTROL BLEEDING IN RETROPERITONEUM, OPEN APPROACH: ICD-10-PCS | Performed by: SURGERY

## 2023-12-29 PROCEDURE — 86900 BLOOD TYPING SEROLOGIC ABO: CPT | Performed by: SURGERY

## 2023-12-29 PROCEDURE — 250N000011 HC RX IP 250 OP 636

## 2023-12-29 PROCEDURE — 250N000011 HC RX IP 250 OP 636: Performed by: SURGERY

## 2023-12-29 PROCEDURE — 250N000009 HC RX 250: Performed by: SURGERY

## 2023-12-29 PROCEDURE — 0SB40ZZ EXCISION OF LUMBOSACRAL DISC, OPEN APPROACH: ICD-10-PCS | Performed by: SURGERY

## 2023-12-29 PROCEDURE — 272N000004 HC RX 272: Performed by: SURGERY

## 2023-12-29 PROCEDURE — 0SG10A0 FUSION OF 2 OR MORE LUMBAR VERTEBRAL JOINTS WITH INTERBODY FUSION DEVICE, ANTERIOR APPROACH, ANTERIOR COLUMN, OPEN APPROACH: ICD-10-PCS | Performed by: SURGERY

## 2023-12-29 PROCEDURE — 8E0WXBF COMPUTER ASSISTED PROCEDURE OF TRUNK REGION, WITH FLUOROSCOPY: ICD-10-PCS | Performed by: SURGERY

## 2023-12-29 PROCEDURE — 250N000013 HC RX MED GY IP 250 OP 250 PS 637

## 2023-12-29 PROCEDURE — 36415 COLL VENOUS BLD VENIPUNCTURE: CPT

## 2023-12-29 DEVICE — IMP SCR PEDICLE MEDT VOYAGER 6.5X45MM 54850016545: Type: IMPLANTABLE DEVICE | Site: SPINE LUMBAR | Status: FUNCTIONAL

## 2023-12-29 DEVICE — GRAFT BONE INFUSE BMP LG 7510600: Type: IMPLANTABLE DEVICE | Site: ABDOMEN | Status: FUNCTIONAL

## 2023-12-29 DEVICE — SCREW BN 25MM 5.5MM NS ENDOSKELETON TAS SPNE INTRBD FS: Type: IMPLANTABLE DEVICE | Site: ABDOMEN | Status: FUNCTIONAL

## 2023-12-29 DEVICE — KIT BNGF 6CC DMNR CORT FBR ACCELERATE GRFTN CNN T50206: Type: IMPLANTABLE DEVICE | Site: ABDOMEN | Status: FUNCTIONAL

## 2023-12-29 DEVICE — IMPLANTABLE DEVICE: Type: IMPLANTABLE DEVICE | Site: SPINE LUMBAR | Status: FUNCTIONAL

## 2023-12-29 DEVICE — INTERBODY FUSION DEVICE  7 DEGREE STANDARD 14MM
Type: IMPLANTABLE DEVICE | Site: ABDOMEN | Status: FUNCTIONAL
Brand: ENDOSKELETON® TAS NANOLOCK® SURFACE TECHNOLOGY

## 2023-12-29 DEVICE — IMP SCR PEDICLE MEDT VOYAGER 6.5X50MM 54850016550: Type: IMPLANTABLE DEVICE | Site: SPINE LUMBAR | Status: FUNCTIONAL

## 2023-12-29 DEVICE — SCREW BN 40MM 6.5MM MA CNN XTD TAB NS SOLERA CD HZN SPNE: Type: IMPLANTABLE DEVICE | Site: SPINE LUMBAR | Status: FUNCTIONAL

## 2023-12-29 DEVICE — IMP SCR MEDT VOYAGER 4.75MM 6440530: Type: IMPLANTABLE DEVICE | Site: SPINE LUMBAR | Status: FUNCTIONAL

## 2023-12-29 RX ORDER — NALOXONE HYDROCHLORIDE 0.4 MG/ML
0.2 INJECTION, SOLUTION INTRAMUSCULAR; INTRAVENOUS; SUBCUTANEOUS
Status: DISCONTINUED | OUTPATIENT
Start: 2023-12-29 | End: 2024-01-03 | Stop reason: HOSPADM

## 2023-12-29 RX ORDER — CEFAZOLIN SODIUM/WATER 2 G/20 ML
2 SYRINGE (ML) INTRAVENOUS SEE ADMIN INSTRUCTIONS
Status: DISCONTINUED | OUTPATIENT
Start: 2023-12-29 | End: 2023-12-29 | Stop reason: HOSPADM

## 2023-12-29 RX ORDER — OXYCODONE HYDROCHLORIDE 5 MG/1
10 TABLET ORAL EVERY 4 HOURS PRN
Status: DISCONTINUED | OUTPATIENT
Start: 2023-12-29 | End: 2024-01-03 | Stop reason: HOSPADM

## 2023-12-29 RX ORDER — LIDOCAINE HYDROCHLORIDE 10 MG/ML
INJECTION, SOLUTION INFILTRATION; PERINEURAL PRN
Status: DISCONTINUED | OUTPATIENT
Start: 2023-12-29 | End: 2023-12-29

## 2023-12-29 RX ORDER — CELECOXIB 200 MG/1
200 CAPSULE ORAL ONCE
Status: COMPLETED | OUTPATIENT
Start: 2023-12-29 | End: 2023-12-29

## 2023-12-29 RX ORDER — FENTANYL CITRATE 50 UG/ML
INJECTION, SOLUTION INTRAMUSCULAR; INTRAVENOUS PRN
Status: DISCONTINUED | OUTPATIENT
Start: 2023-12-29 | End: 2023-12-29

## 2023-12-29 RX ORDER — SODIUM CHLORIDE 9 MG/ML
INJECTION, SOLUTION INTRAVENOUS CONTINUOUS PRN
Status: DISCONTINUED | OUTPATIENT
Start: 2023-12-29 | End: 2023-12-29

## 2023-12-29 RX ORDER — ACETAMINOPHEN 325 MG/1
975 TABLET ORAL EVERY 8 HOURS
Qty: 27 TABLET | Refills: 0 | Status: COMPLETED | OUTPATIENT
Start: 2023-12-29 | End: 2024-01-01

## 2023-12-29 RX ORDER — GABAPENTIN 100 MG/1
100 CAPSULE ORAL
Status: COMPLETED | OUTPATIENT
Start: 2023-12-29 | End: 2023-12-29

## 2023-12-29 RX ORDER — ACETAMINOPHEN 325 MG/1
975 TABLET ORAL ONCE
Status: COMPLETED | OUTPATIENT
Start: 2023-12-29 | End: 2023-12-29

## 2023-12-29 RX ORDER — SODIUM CHLORIDE 9 MG/ML
INJECTION, SOLUTION INTRAVENOUS CONTINUOUS
Status: DISCONTINUED | OUTPATIENT
Start: 2023-12-29 | End: 2024-01-03 | Stop reason: HOSPADM

## 2023-12-29 RX ORDER — LORATADINE 10 MG/1
10 TABLET ORAL DAILY PRN
Status: DISCONTINUED | OUTPATIENT
Start: 2023-12-29 | End: 2024-01-03 | Stop reason: HOSPADM

## 2023-12-29 RX ORDER — PROPOFOL 10 MG/ML
INJECTION, EMULSION INTRAVENOUS PRN
Status: DISCONTINUED | OUTPATIENT
Start: 2023-12-29 | End: 2023-12-29

## 2023-12-29 RX ORDER — ATORVASTATIN CALCIUM 40 MG/1
80 TABLET, FILM COATED ORAL AT BEDTIME
Status: DISCONTINUED | OUTPATIENT
Start: 2023-12-29 | End: 2024-01-03 | Stop reason: HOSPADM

## 2023-12-29 RX ORDER — ONDANSETRON 2 MG/ML
4 INJECTION INTRAMUSCULAR; INTRAVENOUS EVERY 30 MIN PRN
Status: CANCELLED | OUTPATIENT
Start: 2023-12-29

## 2023-12-29 RX ORDER — POLYETHYLENE GLYCOL 3350 17 G/17G
17 POWDER, FOR SOLUTION ORAL DAILY
Status: DISCONTINUED | OUTPATIENT
Start: 2023-12-30 | End: 2024-01-03 | Stop reason: HOSPADM

## 2023-12-29 RX ORDER — OXYCODONE HYDROCHLORIDE 5 MG/1
5 TABLET ORAL EVERY 4 HOURS PRN
Status: DISCONTINUED | OUTPATIENT
Start: 2023-12-29 | End: 2024-01-03 | Stop reason: HOSPADM

## 2023-12-29 RX ORDER — ONDANSETRON 4 MG/1
4 TABLET, ORALLY DISINTEGRATING ORAL EVERY 30 MIN PRN
Status: DISCONTINUED | OUTPATIENT
Start: 2023-12-29 | End: 2023-12-29 | Stop reason: HOSPADM

## 2023-12-29 RX ORDER — ONDANSETRON 2 MG/ML
INJECTION INTRAMUSCULAR; INTRAVENOUS PRN
Status: DISCONTINUED | OUTPATIENT
Start: 2023-12-29 | End: 2023-12-29

## 2023-12-29 RX ORDER — CALCIUM CHLORIDE 100 MG/ML
INJECTION INTRAVENOUS; INTRAVENTRICULAR PRN
Status: DISCONTINUED | OUTPATIENT
Start: 2023-12-29 | End: 2023-12-29

## 2023-12-29 RX ORDER — LIDOCAINE 40 MG/G
CREAM TOPICAL
Status: DISCONTINUED | OUTPATIENT
Start: 2023-12-29 | End: 2023-12-29 | Stop reason: HOSPADM

## 2023-12-29 RX ORDER — HYDROMORPHONE HCL IN WATER/PF 6 MG/30 ML
0.4 PATIENT CONTROLLED ANALGESIA SYRINGE INTRAVENOUS
Status: DISCONTINUED | OUTPATIENT
Start: 2023-12-29 | End: 2024-01-03 | Stop reason: HOSPADM

## 2023-12-29 RX ORDER — MAGNESIUM SULFATE 4 G/50ML
4 INJECTION INTRAVENOUS ONCE
Status: COMPLETED | OUTPATIENT
Start: 2023-12-29 | End: 2023-12-29

## 2023-12-29 RX ORDER — CEFAZOLIN SODIUM/WATER 2 G/20 ML
2 SYRINGE (ML) INTRAVENOUS
Status: COMPLETED | OUTPATIENT
Start: 2023-12-29 | End: 2023-12-29

## 2023-12-29 RX ORDER — SODIUM CHLORIDE, SODIUM LACTATE, POTASSIUM CHLORIDE, CALCIUM CHLORIDE 600; 310; 30; 20 MG/100ML; MG/100ML; MG/100ML; MG/100ML
INJECTION, SOLUTION INTRAVENOUS CONTINUOUS
Status: DISCONTINUED | OUTPATIENT
Start: 2023-12-29 | End: 2023-12-29 | Stop reason: HOSPADM

## 2023-12-29 RX ORDER — NALOXONE HYDROCHLORIDE 0.4 MG/ML
0.4 INJECTION, SOLUTION INTRAMUSCULAR; INTRAVENOUS; SUBCUTANEOUS
Status: DISCONTINUED | OUTPATIENT
Start: 2023-12-29 | End: 2024-01-03 | Stop reason: HOSPADM

## 2023-12-29 RX ORDER — ACETAMINOPHEN 325 MG/1
650 TABLET ORAL EVERY 4 HOURS PRN
Status: DISCONTINUED | OUTPATIENT
Start: 2024-01-01 | End: 2024-01-03 | Stop reason: HOSPADM

## 2023-12-29 RX ORDER — EPHEDRINE SULFATE 50 MG/ML
INJECTION, SOLUTION INTRAMUSCULAR; INTRAVENOUS; SUBCUTANEOUS PRN
Status: DISCONTINUED | OUTPATIENT
Start: 2023-12-29 | End: 2023-12-29

## 2023-12-29 RX ORDER — METHOCARBAMOL 750 MG/1
750 TABLET, FILM COATED ORAL 4 TIMES DAILY
Status: DISCONTINUED | OUTPATIENT
Start: 2023-12-29 | End: 2024-01-03 | Stop reason: HOSPADM

## 2023-12-29 RX ORDER — OXYCODONE HYDROCHLORIDE 5 MG/1
10 TABLET ORAL
Status: CANCELLED | OUTPATIENT
Start: 2023-12-29

## 2023-12-29 RX ORDER — VASOPRESSIN IN 0.9 % NACL 2 UNIT/2ML
SYRINGE (ML) INTRAVENOUS PRN
Status: DISCONTINUED | OUTPATIENT
Start: 2023-12-29 | End: 2023-12-29

## 2023-12-29 RX ORDER — CEFAZOLIN SODIUM 2 G/100ML
2 INJECTION, SOLUTION INTRAVENOUS EVERY 8 HOURS
Qty: 300 ML | Refills: 0 | Status: COMPLETED | OUTPATIENT
Start: 2023-12-30 | End: 2023-12-30

## 2023-12-29 RX ORDER — DEXAMETHASONE SODIUM PHOSPHATE 10 MG/ML
INJECTION, SOLUTION INTRAMUSCULAR; INTRAVENOUS PRN
Status: DISCONTINUED | OUTPATIENT
Start: 2023-12-29 | End: 2023-12-29

## 2023-12-29 RX ORDER — PROCHLORPERAZINE MALEATE 5 MG
5 TABLET ORAL EVERY 6 HOURS PRN
Status: DISCONTINUED | OUTPATIENT
Start: 2023-12-29 | End: 2024-01-03 | Stop reason: HOSPADM

## 2023-12-29 RX ORDER — FENTANYL CITRATE 50 UG/ML
25 INJECTION, SOLUTION INTRAMUSCULAR; INTRAVENOUS EVERY 5 MIN PRN
Status: DISCONTINUED | OUTPATIENT
Start: 2023-12-29 | End: 2023-12-29 | Stop reason: HOSPADM

## 2023-12-29 RX ORDER — AMOXICILLIN 250 MG
1 CAPSULE ORAL 2 TIMES DAILY
Status: DISCONTINUED | OUTPATIENT
Start: 2023-12-29 | End: 2024-01-03 | Stop reason: HOSPADM

## 2023-12-29 RX ORDER — HYDROMORPHONE HCL IN WATER/PF 6 MG/30 ML
0.4 PATIENT CONTROLLED ANALGESIA SYRINGE INTRAVENOUS EVERY 5 MIN PRN
Status: DISCONTINUED | OUTPATIENT
Start: 2023-12-29 | End: 2023-12-29 | Stop reason: HOSPADM

## 2023-12-29 RX ORDER — KETAMINE HYDROCHLORIDE 10 MG/ML
INJECTION INTRAMUSCULAR; INTRAVENOUS PRN
Status: DISCONTINUED | OUTPATIENT
Start: 2023-12-29 | End: 2023-12-29

## 2023-12-29 RX ORDER — BISACODYL 10 MG
10 SUPPOSITORY, RECTAL RECTAL DAILY PRN
Status: DISCONTINUED | OUTPATIENT
Start: 2023-12-29 | End: 2024-01-03 | Stop reason: HOSPADM

## 2023-12-29 RX ORDER — ONDANSETRON 2 MG/ML
4 INJECTION INTRAMUSCULAR; INTRAVENOUS EVERY 30 MIN PRN
Status: DISCONTINUED | OUTPATIENT
Start: 2023-12-29 | End: 2023-12-29 | Stop reason: HOSPADM

## 2023-12-29 RX ORDER — ONDANSETRON 4 MG/1
4 TABLET, ORALLY DISINTEGRATING ORAL EVERY 30 MIN PRN
Status: CANCELLED | OUTPATIENT
Start: 2023-12-29

## 2023-12-29 RX ORDER — HYDROMORPHONE HCL IN WATER/PF 6 MG/30 ML
0.2 PATIENT CONTROLLED ANALGESIA SYRINGE INTRAVENOUS EVERY 5 MIN PRN
Status: DISCONTINUED | OUTPATIENT
Start: 2023-12-29 | End: 2023-12-29 | Stop reason: HOSPADM

## 2023-12-29 RX ORDER — PANTOPRAZOLE SODIUM 40 MG/1
40 TABLET, DELAYED RELEASE ORAL
Status: DISCONTINUED | OUTPATIENT
Start: 2023-12-30 | End: 2024-01-03 | Stop reason: HOSPADM

## 2023-12-29 RX ORDER — ENOXAPARIN SODIUM 100 MG/ML
40 INJECTION SUBCUTANEOUS EVERY 24 HOURS
Status: DISCONTINUED | OUTPATIENT
Start: 2023-12-30 | End: 2024-01-01

## 2023-12-29 RX ORDER — OXYCODONE HYDROCHLORIDE 5 MG/1
5 TABLET ORAL
Status: CANCELLED | OUTPATIENT
Start: 2023-12-29

## 2023-12-29 RX ORDER — FENTANYL CITRATE 50 UG/ML
50 INJECTION, SOLUTION INTRAMUSCULAR; INTRAVENOUS EVERY 5 MIN PRN
Status: DISCONTINUED | OUTPATIENT
Start: 2023-12-29 | End: 2023-12-29 | Stop reason: HOSPADM

## 2023-12-29 RX ORDER — HYDROMORPHONE HCL IN WATER/PF 6 MG/30 ML
0.2 PATIENT CONTROLLED ANALGESIA SYRINGE INTRAVENOUS
Status: DISCONTINUED | OUTPATIENT
Start: 2023-12-29 | End: 2024-01-03 | Stop reason: HOSPADM

## 2023-12-29 RX ORDER — MULTIVITAMIN,THERAPEUTIC
1 TABLET ORAL DAILY
Status: DISCONTINUED | OUTPATIENT
Start: 2023-12-30 | End: 2024-01-03 | Stop reason: HOSPADM

## 2023-12-29 RX ORDER — ONDANSETRON 2 MG/ML
4 INJECTION INTRAMUSCULAR; INTRAVENOUS EVERY 6 HOURS PRN
Status: DISCONTINUED | OUTPATIENT
Start: 2023-12-29 | End: 2024-01-03 | Stop reason: HOSPADM

## 2023-12-29 RX ORDER — ONDANSETRON 4 MG/1
4 TABLET, ORALLY DISINTEGRATING ORAL EVERY 6 HOURS PRN
Status: DISCONTINUED | OUTPATIENT
Start: 2023-12-29 | End: 2024-01-03 | Stop reason: HOSPADM

## 2023-12-29 RX ADMIN — Medication 10 MG: at 13:11

## 2023-12-29 RX ADMIN — PHENYLEPHRINE HYDROCHLORIDE 0.8 MCG/KG/MIN: 10 INJECTION INTRAVENOUS at 12:20

## 2023-12-29 RX ADMIN — MAGNESIUM SULFATE HEPTAHYDRATE 4 G: 4 INJECTION, SOLUTION INTRAVENOUS at 11:38

## 2023-12-29 RX ADMIN — ACETAMINOPHEN 975 MG: 325 TABLET ORAL at 21:59

## 2023-12-29 RX ADMIN — PHENYLEPHRINE HYDROCHLORIDE 100 MCG: 10 INJECTION INTRAVENOUS at 14:14

## 2023-12-29 RX ADMIN — ROCURONIUM BROMIDE 20 MG: 10 INJECTION, SOLUTION INTRAVENOUS at 15:19

## 2023-12-29 RX ADMIN — CALCIUM CHLORIDE INJECTION 500 MG: 100 INJECTION, SOLUTION INTRAVENOUS at 14:42

## 2023-12-29 RX ADMIN — Medication 1 UNITS: at 14:18

## 2023-12-29 RX ADMIN — SODIUM CHLORIDE, POTASSIUM CHLORIDE, SODIUM LACTATE AND CALCIUM CHLORIDE 1000 ML: 600; 310; 30; 20 INJECTION, SOLUTION INTRAVENOUS at 11:42

## 2023-12-29 RX ADMIN — HYDROMORPHONE HYDROCHLORIDE 0.5 MG: 1 INJECTION, SOLUTION INTRAMUSCULAR; INTRAVENOUS; SUBCUTANEOUS at 12:55

## 2023-12-29 RX ADMIN — SODIUM CHLORIDE: 9 INJECTION, SOLUTION INTRAVENOUS at 13:04

## 2023-12-29 RX ADMIN — CELECOXIB 200 MG: 200 CAPSULE ORAL at 11:27

## 2023-12-29 RX ADMIN — ALBUMIN (HUMAN): 12.5 SOLUTION INTRAVENOUS at 13:13

## 2023-12-29 RX ADMIN — SODIUM CHLORIDE: 9 INJECTION, SOLUTION INTRAVENOUS at 21:18

## 2023-12-29 RX ADMIN — GABAPENTIN 100 MG: 100 CAPSULE ORAL at 11:27

## 2023-12-29 RX ADMIN — BENZOCAINE AND MENTHOL 1 LOZENGE: 15; 3.6 LOZENGE ORAL at 22:13

## 2023-12-29 RX ADMIN — ROCURONIUM BROMIDE 10 MG: 10 INJECTION, SOLUTION INTRAVENOUS at 13:01

## 2023-12-29 RX ADMIN — CEFAZOLIN SODIUM 2 G: 2 INJECTION, SOLUTION INTRAVENOUS at 23:58

## 2023-12-29 RX ADMIN — KETAMINE HYDROCHLORIDE 50 MG: 10 INJECTION INTRAMUSCULAR; INTRAVENOUS at 12:13

## 2023-12-29 RX ADMIN — Medication 2 G: at 15:20

## 2023-12-29 RX ADMIN — Medication 1 UNITS: at 17:22

## 2023-12-29 RX ADMIN — Medication 5 MG: at 13:50

## 2023-12-29 RX ADMIN — Medication 1 UNITS: at 14:16

## 2023-12-29 RX ADMIN — SODIUM CHLORIDE, POTASSIUM CHLORIDE, SODIUM LACTATE AND CALCIUM CHLORIDE: 600; 310; 30; 20 INJECTION, SOLUTION INTRAVENOUS at 14:10

## 2023-12-29 RX ADMIN — ACETAMINOPHEN 975 MG: 325 TABLET ORAL at 11:26

## 2023-12-29 RX ADMIN — SUGAMMADEX 350 MG: 100 INJECTION, SOLUTION INTRAVENOUS at 18:16

## 2023-12-29 RX ADMIN — HYDROMORPHONE HYDROCHLORIDE 0.5 MG: 1 INJECTION, SOLUTION INTRAMUSCULAR; INTRAVENOUS; SUBCUTANEOUS at 12:50

## 2023-12-29 RX ADMIN — HYDROMORPHONE HYDROCHLORIDE 0.5 MG: 1 INJECTION, SOLUTION INTRAMUSCULAR; INTRAVENOUS; SUBCUTANEOUS at 16:51

## 2023-12-29 RX ADMIN — FENTANYL CITRATE 50 MCG: 50 INJECTION INTRAMUSCULAR; INTRAVENOUS at 16:09

## 2023-12-29 RX ADMIN — MIDAZOLAM 2 MG: 1 INJECTION INTRAMUSCULAR; INTRAVENOUS at 12:05

## 2023-12-29 RX ADMIN — ROCURONIUM BROMIDE 20 MG: 10 INJECTION, SOLUTION INTRAVENOUS at 14:21

## 2023-12-29 RX ADMIN — ALBUMIN (HUMAN): 12.5 SOLUTION INTRAVENOUS at 14:12

## 2023-12-29 RX ADMIN — ROCURONIUM BROMIDE 10 MG: 10 INJECTION, SOLUTION INTRAVENOUS at 12:28

## 2023-12-29 RX ADMIN — METHOCARBAMOL TABLETS 750 MG: 750 TABLET, COATED ORAL at 21:59

## 2023-12-29 RX ADMIN — ATORVASTATIN CALCIUM 80 MG: 40 TABLET, FILM COATED ORAL at 21:59

## 2023-12-29 RX ADMIN — DEXAMETHASONE SODIUM PHOSPHATE 10 MG: 10 INJECTION, SOLUTION INTRAMUSCULAR; INTRAVENOUS at 12:16

## 2023-12-29 RX ADMIN — PHENYLEPHRINE HYDROCHLORIDE 100 MCG: 10 INJECTION INTRAVENOUS at 18:13

## 2023-12-29 RX ADMIN — SODIUM CHLORIDE, POTASSIUM CHLORIDE, SODIUM LACTATE AND CALCIUM CHLORIDE: 600; 310; 30; 20 INJECTION, SOLUTION INTRAVENOUS at 12:48

## 2023-12-29 RX ADMIN — SENNOSIDES AND DOCUSATE SODIUM 1 TABLET: 8.6; 5 TABLET ORAL at 21:59

## 2023-12-29 RX ADMIN — LIDOCAINE HYDROCHLORIDE 50 MG: 10 INJECTION, SOLUTION INFILTRATION; PERINEURAL at 12:12

## 2023-12-29 RX ADMIN — DEXMEDETOMIDINE HYDROCHLORIDE 0.3 MCG/KG/HR: 100 INJECTION, SOLUTION INTRAVENOUS at 12:16

## 2023-12-29 RX ADMIN — FENTANYL CITRATE 50 MCG: 50 INJECTION INTRAMUSCULAR; INTRAVENOUS at 14:44

## 2023-12-29 RX ADMIN — ROCURONIUM BROMIDE 10 MG: 10 INJECTION, SOLUTION INTRAVENOUS at 16:48

## 2023-12-29 RX ADMIN — Medication 2 G: at 12:05

## 2023-12-29 RX ADMIN — FENTANYL CITRATE 100 MCG: 50 INJECTION INTRAMUSCULAR; INTRAVENOUS at 12:12

## 2023-12-29 RX ADMIN — ROCURONIUM BROMIDE 15 MG: 10 INJECTION, SOLUTION INTRAVENOUS at 13:26

## 2023-12-29 RX ADMIN — PHENYLEPHRINE HYDROCHLORIDE 100 MCG: 10 INJECTION INTRAVENOUS at 13:52

## 2023-12-29 RX ADMIN — HYDROMORPHONE HYDROCHLORIDE 0.5 MG: 1 INJECTION, SOLUTION INTRAMUSCULAR; INTRAVENOUS; SUBCUTANEOUS at 17:58

## 2023-12-29 RX ADMIN — ALBUMIN (HUMAN): 12.5 SOLUTION INTRAVENOUS at 14:25

## 2023-12-29 RX ADMIN — PROPOFOL 200 MG: 10 INJECTION, EMULSION INTRAVENOUS at 12:13

## 2023-12-29 RX ADMIN — PHENYLEPHRINE HYDROCHLORIDE 100 MCG: 10 INJECTION INTRAVENOUS at 18:16

## 2023-12-29 RX ADMIN — ALBUMIN (HUMAN): 12.5 SOLUTION INTRAVENOUS at 14:05

## 2023-12-29 RX ADMIN — Medication 10 MG: at 14:12

## 2023-12-29 RX ADMIN — ONDANSETRON 4 MG: 2 INJECTION INTRAMUSCULAR; INTRAVENOUS at 13:03

## 2023-12-29 RX ADMIN — ROCURONIUM BROMIDE 50 MG: 10 INJECTION, SOLUTION INTRAVENOUS at 12:13

## 2023-12-29 RX ADMIN — ALBUMIN (HUMAN): 12.5 SOLUTION INTRAVENOUS at 12:20

## 2023-12-29 ASSESSMENT — ACTIVITIES OF DAILY LIVING (ADL)
ADLS_ACUITY_SCORE: 24
ADLS_ACUITY_SCORE: 22
ADLS_ACUITY_SCORE: 24

## 2023-12-29 NOTE — PHARMACY-ADMISSION MEDICATION HISTORY
Pharmacist completed medication history with the patient while in the [facility].  Prior to admission (PTA) med list completed and updated in the electronic medical record (EMR).  Pharmacy Note - Admission Medication History  Pertinent Provider Information: none   ______________________________________________________________________  Prior To Admission (PTA) med list completed and updated in EMR.     Medications Prior to Admission   Medication Sig Dispense Refill Last Dose    acetaminophen (TYLENOL) 325 MG tablet Take 325-650 mg by mouth every 6 hours as needed   12/28/2023    albuterol (PROAIR HFA/PROVENTIL HFA/VENTOLIN HFA) 108 (90 Base) MCG/ACT inhaler Inhale 2 puffs into the lungs every 6 hours as needed   Past Month    apixaban ANTICOAGULANT (ELIQUIS ANTICOAGULANT) 5 MG tablet    12/26/2023 at pm    aspirin (ASA) 81 MG chewable tablet Take 81 mg by mouth daily   12/22/2023    atorvastatin (LIPITOR) 80 MG tablet Take 80 mg by mouth at bedtime   12/28/2023 at hs    clobetasol (TEMOVATE) 0.05 % external ointment Apply topically as needed   12/22/2023    diphenhydrAMINE (BENADRYL) 25 MG tablet Take 1 tablet by mouth every 6 hours as needed       DULoxetine (CYMBALTA) 20 MG capsule Take 20 mg by mouth daily   12/15/2023    fluticasone (FLOVENT HFA) 44 MCG/ACT inhaler 1 puff 2 times daily   12/22/2023    meloxicam (MOBIC) 15 MG tablet Take 7.5 mg by mouth   Past Month    metoprolol tartrate (LOPRESSOR) 25 MG tablet Take 12.5 mg by mouth   12/25/2023    pantoprazole (PROTONIX) 40 MG EC tablet    12/25/2023    traZODone (DESYREL) 100 MG tablet Take 100 mg by mouth   Past Month    triamcinolone (KENALOG) 0.1 % external cream Apply topically 2 times daily as needed   More than a month    Vitamin D3 (CHOLECALCIFEROL) 25 mcg (1000 units) tablet Take 1 tablet by mouth daily   12/15/2023    zolpidem (AMBIEN) 5 MG tablet Take 5 mg by mouth nightly as needed   12/15/2023         Information source(s): Patient and  CareEverywhere/SureScripts  Patient was asked about OTC/herbal products specifically.  PTA med list reflects this.  Based on the pharmacist s assessment, the PTA med list information appears reliable  Allergies were reviewed, assessed, and updated with the patient.    Medications available for use during hospital stay: NONE  Thank you for the opportunity to participate in the care of this patient.    The patient was asked about OTC/herbal products specifically and the PTA med list reflects the patient's response.    Allergies were reviewed and assessed with the patient, responses were updated in the EMR.    Thank you for the opportunity to participate in the care of this patient.    Adarsh Huitron RP 12/29/2023 11:33 AM

## 2023-12-29 NOTE — ANESTHESIA PROCEDURE NOTES
Airway       Patient location during procedure: OR       Procedure Start/Stop Times: 12/29/2023 12:15 PM  Staff -        CRNA: Oscar Castillo APRN CRNA       Performed By: CRNA  Consent for Airway        Urgency: elective  Indications and Patient Condition       Indications for airway management: sheila-procedural       Induction type:intravenous       Mask difficulty assessment: 1 - vent by mask    Final Airway Details       Final airway type: endotracheal airway       Successful airway: ETT - single  Endotracheal Airway Details        ETT size (mm): 8.0       Cuffed: yes       Cuff volume (mL): 10       Successful intubation technique: direct laryngoscopy       DL Blade Type: Sanders 2       Grade View of Cords: 1       Adjucts: stylet       Position: Right       Measured from: lips       Secured at (cm): 23       Bite block used: Soft    Post intubation assessment        Placement verified by: capnometry, equal breath sounds and chest rise        Number of attempts at approach: 1       Number of other approaches attempted: 0       Secured with: tape       Ease of procedure: easy       Dentition: Lips/oral mucosa injury and Unchanged       Dental guard used and removed. Dental Guard Type: Proguard Red.    Medication(s) Administered   Medication Administration Time: 12/29/2023 12:15 PM

## 2023-12-29 NOTE — ANESTHESIA PROCEDURE NOTES
Arterial Line Procedure Note    Pre-Procedure   Staff -        Anesthesiologist:  Mary Granda MD       Performed By: anesthesiologist       Location: OR       Pre-Anesthestic Checklist: patient identified, IV checked, risks and benefits discussed, informed consent, monitors and equipment checked, pre-op evaluation and at physician/surgeon's request  Timeout:       Correct Patient: Yes        Correct Procedure: Yes        Correct Site: Yes        Correct Position: Yes   Line Placement:   This line was placed Post Induction  Procedure   Procedure: arterial line, new line and elective       Laterality: left       Insertion Site: radial.  Sterile Prep        Standard elements of sterile barrier followed       Skin prep: Chloraprep  Insertion/Injection        Technique: ultrasound guided        1. Ultrasound was used to evaluate the access site.       2. Artery evaluated via ultrasound for patency/adequacy.       3. Using real-time ultrasound the needle/catheter was observed entering the artery/vein.       4. Permanent image was captured and entered into the patient's record.       5. The visualized structures were anatomically normal.       6. There were no apparent abnormal pathologic findings.       Catheter Type/Size: 20 G, 12 cm  Narrative         Secured by: suture       Tegaderm and Biopatch dressing used.       Complications: None apparent,        Arterial waveform: Yes        IBP within 10% of NIBP: Yes

## 2023-12-29 NOTE — INTERVAL H&P NOTE
I have reviewed the surgical (or preoperative) H&P that is linked to this encounter, and examined the patient. There are no significant changes    Clinical Conditions Present on Arrival:  Clinically Significant Risk Factors Present on Admission          # Hypercalcemia: Highest Ca = 10.5 mg/dL in last 30 days, will monitor as appropriate       # Drug Induced Coagulation Defect: home medication list includes an anticoagulant medication  # Drug Induced Platelet Defect: home medication list includes an antiplatelet medication  # Overweight: Estimated body mass index is 28.21 kg/m  as calculated from the following:    Height as of this encounter: 6' (1.829 m).    Weight as of this encounter: 208 lb (94.3 kg).

## 2023-12-29 NOTE — ANESTHESIA PREPROCEDURE EVALUATION
Anesthesia Pre-Procedure Evaluation    Patient: Raoul Henry   MRN: [id number] : 1955        Procedure : Procedure(s):  lumbar 3-lumbar 4, lumbar 4-lumbar 5 and lumbar 5 sacral 1 anterior lumbar interbody fusion with use of bone morphogenic protein and lumbar 3-sacral 1 posterior minimally invasive instrumented fusion with use of stealth  SURGICAL EXPOSURE, ANTERIOR APPROACH, WITH WOUND CLOSURE, FOR LUMBAR SPINE SURGERY, BY GENERAL SURGERY          Past Medical History:   Diagnosis Date    Antiplatelet or antithrombotic long-term use     Arrhythmia     Gastroesophageal reflux disease     Hypertension     Thrombosis     Uncomplicated asthma       History reviewed. No pertinent surgical history.   Allergies   Allergen Reactions    Amoxicillin Itching    Codeine Hives      Social History     Tobacco Use    Smoking status: Former     Types: Cigarettes     Passive exposure: Never    Smokeless tobacco: Never   Substance Use Topics    Alcohol use: Yes     Comment: stopped after       Wt Readings from Last 1 Encounters:   23 94.3 kg (208 lb)        Anesthesia Evaluation   Pt has had prior anesthetic.     No history of anesthetic complications       ROS/MED HX  ENT/Pulmonary:     (+)                      asthma                  Neurologic: Comment: Chronic pain, R>L (weakness/sensation)  Not on opioids  Baseline pain: 0 at rest, 7 with walking      Cardiovascular:     (+)  hypertension- -  CAD angina-  - -   Taking blood thinners                                   METS/Exercise Tolerance:     Hematologic:       Musculoskeletal:       GI/Hepatic:     (+) GERD,                   Renal/Genitourinary:  - neg Renal ROS     Endo:  - neg endo ROS     Psychiatric/Substance Use:       Infectious Disease:       Malignancy:       Other:            Physical Exam    Airway        Mallampati: II   TM distance: > 3 FB   Neck ROM: full   Mouth opening: > 3 cm    Respiratory Devices and Support         Dental       (+)  "Multiple crowns, permanant bridges      Cardiovascular          Rhythm and rate: regular and normal     Pulmonary           breath sounds clear to auscultation           OUTSIDE LABS:  CBC:   Lab Results   Component Value Date    WBC 5.3 12/20/2023    HGB 14.5 12/20/2023    HCT 42.1 12/20/2023     12/20/2023     BMP:   Lab Results   Component Value Date     12/20/2023    POTASSIUM 3.8 12/20/2023    CHLORIDE 102 12/20/2023    CO2 23 12/20/2023    BUN 19.2 12/20/2023    CR 1.11 12/20/2023    GLC 91 12/29/2023    GLC 89 12/20/2023     COAGS:   Lab Results   Component Value Date    PTT 34 12/20/2023    INR 1.24 (H) 12/20/2023     POC: No results found for: \"BGM\", \"HCG\", \"HCGS\"  HEPATIC: No results found for: \"ALBUMIN\", \"PROTTOTAL\", \"ALT\", \"AST\", \"GGT\", \"ALKPHOS\", \"BILITOTAL\", \"BILIDIRECT\", \"SHANDA\"  OTHER:   Lab Results   Component Value Date    ASHLYN 10.5 (H) 12/20/2023       Anesthesia Plan    ASA Status:  3    NPO Status:  NPO Appropriate    Anesthesia Type: General.     - Airway: ETT         Techniques and Equipment:     - Lines/Monitors: Arterial Line, 2nd IV     Consents    Anesthesia Plan(s) and associated risks, benefits, and realistic alternatives discussed. Questions answered and patient/representative(s) expressed understanding.     - Discussed: Risks, Benefits and Alternatives for BOTH SEDATION and the PROCEDURE were discussed     - Discussed with:  Patient            Postoperative Care            Comments:               Mary Granda MD    I have reviewed the pertinent notes and labs in the chart from the past 30 days and (re)examined the patient.  Any updates or changes from those notes are reflected in this note.      # Hypercalcemia: Highest Ca = 10.5 mg/dL in last 30 days, will monitor as appropriate       # Drug Induced Coagulation Defect: home medication list includes an anticoagulant medication  # Drug Induced Platelet Defect: home medication list includes an antiplatelet medication  # " Overweight: Estimated body mass index is 28.21 kg/m  as calculated from the following:    Height as of this encounter: 1.829 m (6').    Weight as of this encounter: 94.3 kg (208 lb).

## 2023-12-30 ENCOUNTER — DOCUMENTATION ONLY (OUTPATIENT)
Dept: ORTHOPEDICS | Facility: CLINIC | Age: 68
End: 2023-12-30

## 2023-12-30 LAB
ANION GAP SERPL CALCULATED.3IONS-SCNC: 10 MMOL/L (ref 7–15)
BASOPHILS # BLD AUTO: 0 10E3/UL (ref 0–0.2)
BASOPHILS NFR BLD AUTO: 0 %
BUN SERPL-MCNC: 12.7 MG/DL (ref 8–23)
CALCIUM SERPL-MCNC: 10 MG/DL (ref 8.8–10.2)
CHLORIDE SERPL-SCNC: 104 MMOL/L (ref 98–107)
CREAT SERPL-MCNC: 1 MG/DL (ref 0.67–1.17)
DEPRECATED HCO3 PLAS-SCNC: 25 MMOL/L (ref 22–29)
EGFRCR SERPLBLD CKD-EPI 2021: 83 ML/MIN/1.73M2
EOSINOPHIL # BLD AUTO: 0 10E3/UL (ref 0–0.7)
EOSINOPHIL NFR BLD AUTO: 0 %
ERYTHROCYTE [DISTWIDTH] IN BLOOD BY AUTOMATED COUNT: 14 % (ref 10–15)
ERYTHROCYTE [DISTWIDTH] IN BLOOD BY AUTOMATED COUNT: 14.2 % (ref 10–15)
GLUCOSE SERPL-MCNC: 114 MG/DL (ref 70–99)
HCT VFR BLD AUTO: 36 % (ref 40–53)
HCT VFR BLD AUTO: 38 % (ref 40–53)
HGB BLD-MCNC: 12.4 G/DL (ref 13.3–17.7)
HGB BLD-MCNC: 12.8 G/DL (ref 13.3–17.7)
IMM GRANULOCYTES # BLD: 0 10E3/UL
IMM GRANULOCYTES NFR BLD: 0 %
LYMPHOCYTES # BLD AUTO: 1 10E3/UL (ref 0.8–5.3)
LYMPHOCYTES NFR BLD AUTO: 12 %
MCH RBC QN AUTO: 30.1 PG (ref 26.5–33)
MCH RBC QN AUTO: 30.3 PG (ref 26.5–33)
MCHC RBC AUTO-ENTMCNC: 33.7 G/DL (ref 31.5–36.5)
MCHC RBC AUTO-ENTMCNC: 34.4 G/DL (ref 31.5–36.5)
MCV RBC AUTO: 88 FL (ref 78–100)
MCV RBC AUTO: 89 FL (ref 78–100)
MONOCYTES # BLD AUTO: 0.7 10E3/UL (ref 0–1.3)
MONOCYTES NFR BLD AUTO: 8 %
NEUTROPHILS # BLD AUTO: 7.1 10E3/UL (ref 1.6–8.3)
NEUTROPHILS NFR BLD AUTO: 80 %
NRBC # BLD AUTO: 0 10E3/UL
NRBC BLD AUTO-RTO: 0 /100
PLATELET # BLD AUTO: 155 10E3/UL (ref 150–450)
PLATELET # BLD AUTO: 155 10E3/UL (ref 150–450)
POTASSIUM SERPL-SCNC: 4.2 MMOL/L (ref 3.4–5.3)
RBC # BLD AUTO: 4.09 10E6/UL (ref 4.4–5.9)
RBC # BLD AUTO: 4.25 10E6/UL (ref 4.4–5.9)
SODIUM SERPL-SCNC: 139 MMOL/L (ref 135–145)
WBC # BLD AUTO: 8.9 10E3/UL (ref 4–11)
WBC # BLD AUTO: 9.6 10E3/UL (ref 4–11)

## 2023-12-30 PROCEDURE — 250N000011 HC RX IP 250 OP 636

## 2023-12-30 PROCEDURE — 85027 COMPLETE CBC AUTOMATED: CPT | Performed by: PHYSICIAN ASSISTANT

## 2023-12-30 PROCEDURE — 99232 SBSQ HOSP IP/OBS MODERATE 35: CPT | Performed by: HOSPITALIST

## 2023-12-30 PROCEDURE — 999N000104 HC STATISTIC NO CHARGE: Performed by: OCCUPATIONAL THERAPIST

## 2023-12-30 PROCEDURE — 120N000001 HC R&B MED SURG/OB

## 2023-12-30 PROCEDURE — 250N000013 HC RX MED GY IP 250 OP 250 PS 637

## 2023-12-30 PROCEDURE — 36415 COLL VENOUS BLD VENIPUNCTURE: CPT | Performed by: PHYSICIAN ASSISTANT

## 2023-12-30 PROCEDURE — 36415 COLL VENOUS BLD VENIPUNCTURE: CPT

## 2023-12-30 PROCEDURE — 80048 BASIC METABOLIC PNL TOTAL CA: CPT | Performed by: STUDENT IN AN ORGANIZED HEALTH CARE EDUCATION/TRAINING PROGRAM

## 2023-12-30 PROCEDURE — 250N000013 HC RX MED GY IP 250 OP 250 PS 637: Performed by: HOSPITALIST

## 2023-12-30 PROCEDURE — 85025 COMPLETE CBC W/AUTO DIFF WBC: CPT

## 2023-12-30 PROCEDURE — 250N000013 HC RX MED GY IP 250 OP 250 PS 637: Performed by: STUDENT IN AN ORGANIZED HEALTH CARE EDUCATION/TRAINING PROGRAM

## 2023-12-30 RX ORDER — TRAZODONE HYDROCHLORIDE 100 MG/1
100 TABLET ORAL
Status: DISCONTINUED | OUTPATIENT
Start: 2023-12-30 | End: 2023-12-30

## 2023-12-30 RX ORDER — TRAZODONE HYDROCHLORIDE 50 MG/1
50-100 TABLET, FILM COATED ORAL
Status: DISCONTINUED | OUTPATIENT
Start: 2023-12-30 | End: 2024-01-03 | Stop reason: HOSPADM

## 2023-12-30 RX ADMIN — ENOXAPARIN SODIUM 40 MG: 100 INJECTION SUBCUTANEOUS at 16:43

## 2023-12-30 RX ADMIN — METHOCARBAMOL TABLETS 750 MG: 750 TABLET, COATED ORAL at 13:58

## 2023-12-30 RX ADMIN — PANTOPRAZOLE SODIUM 40 MG: 40 TABLET, DELAYED RELEASE ORAL at 08:31

## 2023-12-30 RX ADMIN — ACETAMINOPHEN 975 MG: 325 TABLET ORAL at 22:55

## 2023-12-30 RX ADMIN — OXYCODONE HYDROCHLORIDE 10 MG: 5 TABLET ORAL at 19:12

## 2023-12-30 RX ADMIN — TRAZODONE HYDROCHLORIDE 50 MG: 50 TABLET ORAL at 22:59

## 2023-12-30 RX ADMIN — METHOCARBAMOL TABLETS 750 MG: 750 TABLET, COATED ORAL at 08:31

## 2023-12-30 RX ADMIN — ACETAMINOPHEN 975 MG: 325 TABLET ORAL at 13:58

## 2023-12-30 RX ADMIN — SENNOSIDES AND DOCUSATE SODIUM 1 TABLET: 8.6; 5 TABLET ORAL at 20:29

## 2023-12-30 RX ADMIN — CEFAZOLIN SODIUM 2 G: 2 INJECTION, SOLUTION INTRAVENOUS at 08:29

## 2023-12-30 RX ADMIN — SENNOSIDES AND DOCUSATE SODIUM 1 TABLET: 8.6; 5 TABLET ORAL at 08:30

## 2023-12-30 RX ADMIN — METHOCARBAMOL TABLETS 750 MG: 750 TABLET, COATED ORAL at 19:06

## 2023-12-30 RX ADMIN — ACETAMINOPHEN 975 MG: 325 TABLET ORAL at 05:58

## 2023-12-30 RX ADMIN — CEFAZOLIN SODIUM 2 G: 2 INJECTION, SOLUTION INTRAVENOUS at 16:43

## 2023-12-30 RX ADMIN — METOPROLOL TARTRATE 12.5 MG: 25 TABLET, FILM COATED ORAL at 08:29

## 2023-12-30 RX ADMIN — ATORVASTATIN CALCIUM 80 MG: 40 TABLET, FILM COATED ORAL at 20:29

## 2023-12-30 RX ADMIN — THERA TABS 1 TABLET: TAB at 08:29

## 2023-12-30 RX ADMIN — METOPROLOL TARTRATE 12.5 MG: 25 TABLET, FILM COATED ORAL at 20:28

## 2023-12-30 RX ADMIN — POLYETHYLENE GLYCOL 3350 17 G: 17 POWDER, FOR SOLUTION ORAL at 08:31

## 2023-12-30 RX ADMIN — OXYCODONE HYDROCHLORIDE 10 MG: 5 TABLET ORAL at 02:27

## 2023-12-30 ASSESSMENT — ACTIVITIES OF DAILY LIVING (ADL)
ADLS_ACUITY_SCORE: 26
ADLS_ACUITY_SCORE: 28
ADLS_ACUITY_SCORE: 28
ADLS_ACUITY_SCORE: 26
ADLS_ACUITY_SCORE: 28
ADLS_ACUITY_SCORE: 26
ADLS_ACUITY_SCORE: 24
ADLS_ACUITY_SCORE: 26
ADLS_ACUITY_SCORE: 28

## 2023-12-30 NOTE — ANESTHESIA POSTPROCEDURE EVALUATION
Patient: Raoul Henry    Procedure: Procedure(s):  lumbar 3-lumbar 4, lumbar 4-lumbar 5 and lumbar 5 sacral 1 anterior lumbar interbody fusion with use of bone morphogenic protein and lumbar 3-sacral 1 posterior minimally invasive instrumented fusion with use of stealth  SURGICAL EXPOSURE, ANTERIOR APPROACH, WITH WOUND CLOSURE, FOR LUMBAR SPINE SURGERY, BY GENERAL SURGERY       Anesthesia Type:  General    Note:     Postop Pain Control: Uneventful            Sign Out: Well controlled pain   PONV: No   Neuro/Psych: Uneventful            Sign Out: Acceptable/Baseline neuro status   Airway/Respiratory: Uneventful            Sign Out: Acceptable/Baseline resp. status   CV/Hemodynamics: Uneventful            Sign Out: Acceptable CV status; No obvious hypovolemia; No obvious fluid overload   Other NRE: NONE   DID A NON-ROUTINE EVENT OCCUR? No           Last vitals:  Vitals Value Taken Time   /65 12/29/23 2010   Temp 36.1  C (97  F) 12/29/23 2010   Pulse 73 12/29/23 2010   Resp 15 12/29/23 2010   SpO2 98 % 12/29/23 2010       Electronically Signed By: Freddy Dickson MD  December 29, 2023  8:16 PM

## 2023-12-30 NOTE — PLAN OF CARE
Problem: Adult Inpatient Plan of Care  Goal: Absence of Hospital-Acquired Illness or Injury  Intervention: Identify and Manage Fall Risk  Recent Flowsheet Documentation  Taken 12/29/2023 2038 by Nena Nayak, RN  Safety Promotion/Fall Prevention:   activity supervised   clutter free environment maintained   increased rounding and observation   increase visualization of patient   lighting adjusted   mobility aid in reach   nonskid shoes/slippers when out of bed   patient and family education   room door open   safety round/check completed   Pt admitted to unit from PACU at 2030 for lumbar fusion. VSS on 1-2 L oxygen. CMS intact except baseline numbness to right toe, improving. Dressing to abdomen with shadow drainage, marked. Dressing to back, cdi x3. Hu intact. Turning in bed. Pain managed with scheduled tylenol and scheduled robaxin. NS at 75 ml/hr infusing. Not oob yet.

## 2023-12-30 NOTE — OP NOTE
Preoperative diagnosis: Lumbar radiculopathy with failure of conservative management.    Postoperative diagnosis: Same.    Procedure:  1.  Left retroperitoneal exposure of L5-S1 and intervertebral disc space for anterior lumbar interbody fusion.  2.  Left retroperitoneal exposure of L4-L5 and intervertebral disc space for anterior lumbar interbody fusion.  3.  Left retroperitoneal exposure of L3-L4 and intervertebral disc space for anterior lumbar interbody fusion.    Surgeon: Bacilio Ojeda M.D.   Co-surgeon: Yaneth Meyer M.D.     Assistant: Marie Dickson PA-C.    Anesthesia: General.  Estimated blood loss: About 2 L.  Complications: Inadvertent injury to the left common iliac vein which was repaired with 4-0 and 5-0 Prolene sutures and surgical clips.  Specimens: None.  Drains: None.    Indication for procedure: This is a 68-year-old male with above-mentioned spinal pathology.  Vascular surgery services are requested by Dr. Meyer for exposure for anterior lumbar interbody fusion.    Procedure details: Patient was identified and then taken to the operating room and placed in supine position.  General anesthesia was induced.  Preoperative intravenous antibiotics were administered.  The anterior abdominal wall was prepped and a sterile surgical field was created.    Preprocedure timeout was conducted.  A generous incision was made in the left paramedian plane from the symphysis pubis to above the umbilicus.  It was deepened with electrocautery.  The left rectus abdominis fascia was divided with electrocautery along the length of the incision.  The left rectus abdominis muscle was retracted laterally.  The perforating branches of the deep inferior epigastric vessels were secured with titanium plate.  Starting from the left iliac fossa a left retroperitoneal plane was created.  The left retroperitoneal viscera including the left ureter were reflected superomedially.  We first aim for the L5-S1 intervertebral  disc space.  It was identified under the bifurcation of the inferior vena cava and the confluence of the right and left common iliac veins.  The left common iliac vein was densely adherent to the L5-S1 intervertebral disc space.  We first identified the L5-S1 intervertebral disc space by placing a spinal needle and and performing fluoroscopy.  As I was attempting to mobilize the left iliac vein to the left side it encountered and inadvertent tear.  This resulted in significant blood loss and we had to repaired with 4-0 and 5-0 Prolene sutures to achieve complete hemostasis.  Once complete hemostasis was achieved Dr. Meyer proceeded with L5-S1 discectomy and cage implantation.    After the successful completion of that part of the procedure we mobilized the left common iliac artery and the distal aorta to the right side and identified the L4-L5 and L3-L4 intervertebral disc spaces.  These were also confirmed by placing a spinal needle and a hemostat in the intervertebral disc space and performing fluoroscopy.  Then Dr. Meyer proceeded with discectomy and cage implantation.    After the successful completion of those procedures the retroperitoneal viscera were allowed to fall back into the natural configuration.  Adequate hemostasis was confirmed.    The rectus fascia was approximated with 0 PDS in running fashion.  Jose's layer was approximated with 2-0 Vicryl in interrupted fashion.  Subdermal layer was approximated with 2-0 Vicryl and subdermal layer.  Skin was approximated with vertical mattress 3-0 chromic sutures.    Counts of instruments, sponges and needle was noted to be correct.

## 2023-12-30 NOTE — BRIEF OP NOTE
[facility] Brief Operative Note    Pre-operative diagnosis: Lumbar spine instability [M53.2X6]  Spondylolisthesis of lumbar region [M43.16]   Post-operative diagnosis Same    Procedure: Procedure(s):  lumbar 3-lumbar 4, lumbar 4-lumbar 5 and lumbar 5 sacral 1 anterior lumbar interbody fusion with use of bone morphogenic protein and lumbar 3-sacral 1 posterior minimally invasive instrumented fusion with use of stealth  SURGICAL EXPOSURE, ANTERIOR APPROACH, WITH WOUND CLOSURE, FOR LUMBAR SPINE SURGERY, BY GENERAL SURGERY   Surgeon(s): Surgeon(s) and Role:     * Yaneth Meyer MD - Primary     * Bacilio Ojeda MD - Assisting     * Marie Dickson PA-C - Assisting   Estimated blood loss: 1950 mL    Specimens: ID Type Source Tests Collected by Time Destination   A :  Blood Hand, Right CBC WITH PLATELETS, ABO/RH TYPE AND SCREEN Yaneth Meyer MD 12/29/2023  2:10 PM       Findings: Post op imaging showed adequate hardware placement  Implant Name Type Inv. Item Serial No.  Lot No. LRB No. Used Action   GRAFT BONE INFUSE BMP LG [id number] - [id number]  GRAFT BONE INFUSE BMP LG [id number]  [employer]ANE[employer] [id number] N/A 1 Implanted   KIT BNGF 6CC DMNR DARCY FBR ACCELERATE GRFTN CNN [id number] - [id number] Bone/Tissue/Biologic KIT BNGF 6CC DMNR DARCY FBR ACCELERATE GRFTN CNN [id number] [employer]  N/A 1 Implanted   KIT BNGF 6CC DMNR DARCY FBR ACCELERATE GRFTN CNN [id number] - [id number] Bone/Tissue/Biologic KIT BNGF 6CC DMNR DARCY FBR ACCELERATE GRFTN CNN [id number] [employer]  N/A 1 Implanted   KIT BNGF 6CC DMNR DARCY FBR ACCELERATE GRFTN CNN [id number] - [id number] Bone/Tissue/Biologic KIT BNGF 6CC DMNR DARCY FBR ACCELERATE GRFTN CNN [id number] [employer]  N/A 1 Implanted   CAGE SPNL [id number] ENDOSKELETON TAS 12D 21MM STD TI RADOPQ LG - [id number] Metallic Hardware/[place] CAGE SPNL [id number] ENDOSKELETON TAS 12D 21MM STD TI RADOPQ LG  [employer] [id number] N/A 1  Implanted   NONALOCK ENDOSKELETON [facility] IMPLANT    TITAN SPINE [id number] N/A 1 Implanted   CAGE SPNL [id number] ENDOSKELETON [facility] 7D 21MM STD [id number] - [id number] Metallic Hardware/[place] CAGE SPNL [id number] ENDOSKELETON [facility] 7D 21MM STD [id number]  [employer] [id number]  1 Implanted   SCREW BN 25MM 5.5MM NS ENDOSKELETON [facility] SPNE INTRBD FS - [id number] Metallic Hardware/[place] SCREW BN 25MM 5.5MM NS ENDOSKELETON [facility] SPNE INTRBD FS  [employer]  N/A 9 Implanted

## 2023-12-30 NOTE — PROGRESS NOTES
[facility]    Medicine Progress Note - Hospitalist Service    Date of Admission:  12/29/2023    Assessment & Plan   Raoul Henry is a 68 year old male admitted s/p lumbar fusion.  He is clinically stable.  Recommendations as below.    # s/p lumbar fusion  - per orthopaedics    # CAD, prior MI  - resume ASA when cleared by surgery  - statin, metoprolol    # Hx of PE  - resume apixaban when cleared by surgery  - previous note documents patient had evaluation with hematology and decided to continue lifelong anticoagulation          Diet: Advance Diet as Tolerated: Regular Diet Adult      Hu Catheter: PRESENT, indication: Anesthesia  Lines: None     Cardiac Monitoring: None  Code Status: Full Code      Clinically Significant Risk Factors                  # Hypertension: Noted on problem list        # Overweight: Estimated body mass index is 28.21 kg/m  as calculated from the following:    Height as of this encounter: 1.829 m (6').    Weight as of this encounter: 94.3 kg (208 lb)., PRESENT ON ADMISSION     # Asthma: noted on problem list        Disposition Plan     Expected Discharge Date: 12/31/2023                    Vinnie Kirkpatrick MD  Hospitalist Service  [facility]  Securely message with [employer] (more info)  Text page via [employer] Paging/Directory   ______________________________________________________________________    Interval History   Denies chest pain/pressure  or dyspnea.  Denies numbness or tingling in legs.  He has not yet been out of  bed.  He reports having flatus.  He has not had solid.  He  is tolerating fluids.    Physical Exam   Vital Signs: Temp: 98.5  F (36.9  C) Temp src: Oral BP: (!) 140/78 Pulse: 80   Resp: 18 SpO2: 99 % O2 Device: Nasal cannula Oxygen Delivery: 1 LPM  Weight: 208 lbs 0 oz    98% on room air    Gen:   lying in bed in no extremis  Neuro:  alert, conversant; moves toes in both feet, sensation grossly intact in both feet  CV:  nl  rate, regular rhythm  Pulm: no acute resp distress, ctab anteriorly  GI:  abdomen soft, nontender to palpation outside the area of the incision; strikthrough present in linear fashion on abdominal dressing  MSK:   three back dressings with spotty strikethrough on lower  right dressing    Medical Decision Making             Data   Reviewed:    Na 139  K 4.2  BUN 13  Cr 1    WBC 9.6  Hgb 13  Plts 155

## 2023-12-30 NOTE — ANESTHESIA CARE TRANSFER NOTE
Patient: Raoul Henry    Procedure: Procedure(s):  lumbar 3-lumbar 4, lumbar 4-lumbar 5 and lumbar 5 sacral 1 anterior lumbar interbody fusion with use of bone morphogenic protein and lumbar 3-sacral 1 posterior minimally invasive instrumented fusion with use of stealth  SURGICAL EXPOSURE, ANTERIOR APPROACH, WITH WOUND CLOSURE, FOR LUMBAR SPINE SURGERY, BY GENERAL SURGERY       Diagnosis: Lumbar spine instability [M53.2X6]  Spondylolisthesis of lumbar region [M43.16]  Diagnosis Additional Information: No value filed.    Anesthesia Type:   General     Note:    Oropharynx: endotracheal tube in place and oral airway in place  Level of Consciousness: unresponsive  Oxygen Supplementation: face mask  Level of Supplemental Oxygen (L/min / FiO2): 10  Independent Airway: airway patency not satisfactory and stable  Dentition: dentition unchanged  Vital Signs Stable: post-procedure vital signs reviewed and stable  Report to RN Given: handoff report given  Patient transferred to: PACU  Comments: Asleep, remains intubated and Spont. B, VSS. To pACU with 10L O2 over OETT.  Handoff Report: Identifed the Patient, Identified the Reponsible Provider, Reviewed the pertinent medical history, Discussed the surgical course, Reviewed Intra-OP anesthesia mangement and issues during anesthesia, Set expectations for post-procedure period and Allowed opportunity for questions and acknowledgement of understanding      Vitals:  Vitals Value Taken Time   /79 12/29/23 1900   Temp 37.6  C (99.6  F) 12/29/23 1850   Pulse 93 12/29/23 1901   Resp 18 12/29/23 1901   SpO2 98 % 12/29/23 1901   Vitals shown include unfiled device data.    Electronically Signed By: KWAKU Allen CRNA  December 29, 2023  7:03 PM

## 2023-12-30 NOTE — PROGRESS NOTES
Called patient and touched based on questions and issues he expressed-    He was not aware of his blood loss and tear in left iliac vein during procedure. -updated patient typically surgeons will speak with family members after surgery since patient is still under anesthesia and would not remember conversation. I will certainly touch base with Dr. Meyer and DR. Ojeda to ensure they review with patient now that anesthesia has worn off.   Wondering with tear if there is possibility of bleeding internally and if should be worried.-Reviewed hemoglobin has remained stable and the signs and symptoms to look for and be aware of. I have also reviewed these with his RN. Should these occur with a large drop in hemoglobin, we can certainly obtain an abdominal CT. Right now, we will recheck hemoglobin to ensure it has not drastically dropped. Orders are in   Pain control. Discussed pain regimen he has been on with oxy as needed and muscle relaxant. He would like to continue this regimen and see how he feels. Would not like to add a lot of other medications until he is in pain and needs them. He lives alone and wants to make sure he is ready from a pain standpoint before he leaves which is understandable.   Brace- patient stated several people on the unit were not aware of bracing. Reviewed it is in our notes and I have also added another order for bracing instructions and activity.   Activity- brace to be worn when out of bed and can be off with resting and hygiene. Please limit your lifting to no more that ten pounds and avoid excessive bending, twisting and turning at the lumbar spine. You should also avoid excessive jostling and jarring activities.   Lovenox versus Eliquis. Reviewed Eliquis is stronger than lovenox and therefore that is why Dr. Meyer recommended lovenox to START and will start Eliquis in a few days. Patient in agreement   Transparency- patient feels people on unit were not aware of how large his surgery  was and the amount of blood he lost. He feels he is asking questions and not getting answers- reviewed with patient I will touch base with him every morning on rounds and answer any questions. I will ensure nursing is aware of plans as well. I can certainly check in with nursing in the afternoon should more questions come up as well. Patient was appreciative of this   Sleep- he has not slept in days and is having difficulty worrying about things preventing sleep. Reviewed with patient I will be watching his chart and am on call through the weekend. Nursing staff will update me with any clinical changes and we will take care of him. Hospitalist also gave trazadone to assist.     All information was reviewed [facility] DAMIÁN Saldana as well as with DR. Meyer. All are in agreement with above stated plans. Will see patient tomorrow on rounds. Again reiterated I will be here all weekend and am happy to discuss and answer any questions he has.     Patient in agreement with all above stated plans.     Alisha Nugent PA-C  [facility]  [address] [address]    Tel [phone]
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
## 2023-12-30 NOTE — PLAN OF CARE
Problem: Adult Inpatient Plan of Care  Goal: Absence of Hospital-Acquired Illness or Injury  Intervention: Prevent and Manage VTE (Venous Thromboembolism) Risk  Recent Flowsheet Documentation  Taken 12/30/2023 0030 by Estephania Wadsworth RN  VTE Prevention/Management: SCDs (sequential compression devices) on     Problem: Adult Inpatient Plan of Care  Goal: Optimal Comfort and Wellbeing  Intervention: Monitor Pain and Promote Comfort  Recent Flowsheet Documentation  Taken 12/29/2023 2357 by Estephania Wadsworth RN  Pain Management Interventions:   cold applied   medication offered but refused   Goal Outcome Evaluation:    Patient vital signs are at baseline: No,  Reason:  1 L oxygen  Patient able to ambulate as they were prior to admission or with assist devices provided by therapies during their stay:  No,  Reason:  not oob yet  Patient MUST void prior to discharge:  No, smith in place  Patient able to tolerate oral intake:  Yes  Pain has adequate pain control using Oral analgesics:  Yes  Does patient have an identified :  Yes  Has goal D/C date and time been discussed with patient:  Yes     Patient is alert & oriented x4. Vital signs are stable. On 1 L oxygen for sleep. Pain is controlled with PRN Oxycodone, rest & ice. Denies numbness/tingling. Smith in place due to not getting OOB yet. Needs brace to be delivered. Anterior dressing has some shadowing marked with no change. Posterior dressing is clean, dry and intact.

## 2023-12-30 NOTE — PROGRESS NOTES
Neurosurgery progress note    POD0 lumbar 3-lumbar 4, lumbar 4-lumbar 5 and lumbar 5 sacral 1 anterior lumbar interbody fusion with use of bone morphogenic protein and lumbar 3-sacral 1 posterior minimally invasive instrumented fusion with use of stealth     Plan:  -Admit  -CBC tonight and tomorrow, try and keep Hgb > 10 due to patient's cardiac history  -Plan to start Lovenox tomorrow (12/30) for DVT ppx. Patient can start Eliquis again on POD5 as patient has history of PE and MI. Try and get patient to ambulate as soon as able to assist in DVT prevention  -Hospitalist consult for complex medical management in post op patient, appreciate their assistance  -Orthotics consult for aspen quick draw. Brace should be worn when OOB, OK to be off when patient in bed, for hygiene  -Lumbar XR when able, order in for tomorrow if possible  -No drain  -Antibiotics x 3 doses  -Routine wound cares  -Pain control   -PT/OT when able     Discussed with Dr. Mitch Dickson PA-C  [facility] Neurosurgery  [address] [address]

## 2023-12-30 NOTE — PROGRESS NOTES
Care Management Follow Up    Length of Stay (days): 1    Expected Discharge Date: 12/31/2023     Concerns to be Addressed: Discharge Planning  Patient plan of care discussed at interdisciplinary rounds: No    Anticipated Discharge Disposition: TBD   Anticipated Discharge Services: TBD  Anticipated Discharge DME: None    Patient/family educated on Medicare website which has current facility and service quality ratings: N/A   Education Provided on the Discharge Plan: Per Team  Patient/Family in Agreement with the Plan: Yes    Referrals Placed by CM/SW: None  Private pay costs discussed: Not applicable    Additional Information:  SWCM reviewed chart due to low re-admission risk score. Pt lives in apartment in [place], appears to be independent. In hospital for planned surgery. CM team will continue to follow medical progression and assist with discharge planning as needed. Anticipate family/friend transport.    ANGEL LUIS Antunez

## 2023-12-30 NOTE — PROGRESS NOTES
Patient was seen at [place] [address] for the fitting and delivery of an [place] Lumbar Corset.  Patient was fit while supine in bed without incident.  Patient was pleased with the fit and function. Patient provided my contact information.

## 2023-12-30 NOTE — PROGRESS NOTES
[facility]    Neurosurgery  Daily Note    Assessment & Plan   Procedure(s):  lumbar 3-lumbar 4, lumbar 4-lumbar 5 and lumbar 5 sacral 1 anterior lumbar interbody fusion with use of bone morphogenic protein and lumbar 3-sacral 1 posterior minimally invasive instrumented fusion with use of stealth  SURGICAL EXPOSURE, ANTERIOR APPROACH, WITH WOUND CLOSURE, FOR LUMBAR SPINE SURGERY, BY GENERAL SURGERY   1 Day Post-Op    AM ROUNDS: pain well controlled. Radicular symptoms and paresthesias in lower extremities have improved. Occasional nerve pain with movement. Dressings are intact with mild drainage that has remained stable. Intact on exam and improvement with hip flexors. Did not sleep well overnight, hopeful to catch up on sleep today.     Hemoglobin stable at 12.8 this AM    Plan:  -Advance activity as tolerated. Brace when out of bed and OFF with resting and hygiene. Patient hesitant to ambulate without brace. Once brace obtained, try to get patient ambulating to assist with DVT PPX  -Continue supportive and symptomatic treatment  -Start or continue physical therapy  -Pain control measures  -Lovenox to start today. Eliquis may resume POD5 at patient has history of PE and MI   -ABX x 3 doses pos top   -Bowel regimen  -Advance diet as tolerated  -Routine wound care  -Hospitalist and Orthotics consults appreciated   -Lumbar XR when able  -Hospitalist, orthotics, PT, OT consults appreciated    Plans reviewed with patient, RN, Dr. Mithc Nugent PA-C  [facility] Neurosurgery  [address]  [address] [place] [address]    Tel [phone]  Pager [phone]    Principal Problem:    S/P lumbar fusion     Alisha Nugent PA-C    Interval History   Stable     Physical Exam   Temp: 98.5  F (36.9  C) Temp src: Oral BP: (!) 140/78 Pulse: 80   Resp: 18 SpO2: 99 % O2 Device: Nasal cannula Oxygen Delivery: 1 LPM  Vitals:    12/29/23 1138   Weight: 94.3  kg (208 lb)     Vital Signs with Ranges  Temp:  [97  F (36.1  C)-99.6  F (37.6  C)] 98.5  F (36.9  C)  Pulse:  [61-97] 80  Resp:  [11-20] 18  BP: (100-140)/(54-79) 140/78  MAP:  [3 mmHg-108 mmHg] 3 mmHg  Arterial Line BP: (3-150)/(2-80) 3/2  SpO2:  [92 %-100 %] 99 %  I/O last 3 completed shifts:  In: 4730 [I.V.:2905; Other:825]  Out: 2375 [Urine:325; Blood:2050]    Awake, alert, appropriate   Anterior dressing with mild drainage, stable   Posterior dressings mostly dry   Moving BLE symmetrically with appropriate strength. Per patient, improvement with hip flexion BL  Sensation intact to light touch       Medications    sodium chloride 75 mL/hr at 12/29/23 2118       acetaminophen  975 mg Oral Q8H    atorvastatin  80 mg Oral At Bedtime    ceFAZolin  2 g Intravenous Q8H    enoxaparin ANTICOAGULANT  40 mg Subcutaneous Q24H    heparin (porcine) 30,000 Units in sodium chloride 0.9 % 1,030 mL PERFUSION solution   PERFUSION Once    methocarbamol  750 mg Oral 4x Daily    metoprolol tartrate  12.5 mg Oral BID    multivitamin, therapeutic  1 tablet Oral Daily    pantoprazole  40 mg Oral QAM AC    polyethylene glycol  17 g Oral Daily    senna-docusate  1 tablet Oral BID       Plans discussed with Dr. Meyer who was in agreement with plans    Alisha SERRA [facility] Neurosurgery  [address]  [address] [place] [address]    Tel [phone]  Pager [phone]

## 2023-12-31 ENCOUNTER — APPOINTMENT (OUTPATIENT)
Dept: OCCUPATIONAL THERAPY | Facility: CLINIC | Age: 68
DRG: 460 | End: 2023-12-31
Attending: SURGERY
Payer: COMMERCIAL

## 2023-12-31 ENCOUNTER — APPOINTMENT (OUTPATIENT)
Dept: PHYSICAL THERAPY | Facility: CLINIC | Age: 68
DRG: 460 | End: 2023-12-31
Payer: COMMERCIAL

## 2023-12-31 LAB
ERYTHROCYTE [DISTWIDTH] IN BLOOD BY AUTOMATED COUNT: 14.2 % (ref 10–15)
HCT VFR BLD AUTO: 35.1 % (ref 40–53)
HGB BLD-MCNC: 11.9 G/DL (ref 13.3–17.7)
HOLD SPECIMEN: NORMAL
MCH RBC QN AUTO: 29.9 PG (ref 26.5–33)
MCHC RBC AUTO-ENTMCNC: 33.9 G/DL (ref 31.5–36.5)
MCV RBC AUTO: 88 FL (ref 78–100)
PLATELET # BLD AUTO: 142 10E3/UL (ref 150–450)
RBC # BLD AUTO: 3.98 10E6/UL (ref 4.4–5.9)
WBC # BLD AUTO: 7.7 10E3/UL (ref 4–11)

## 2023-12-31 PROCEDURE — 97530 THERAPEUTIC ACTIVITIES: CPT | Mod: GP

## 2023-12-31 PROCEDURE — 250N000013 HC RX MED GY IP 250 OP 250 PS 637: Performed by: PHYSICIAN ASSISTANT

## 2023-12-31 PROCEDURE — 250N000011 HC RX IP 250 OP 636

## 2023-12-31 PROCEDURE — 97535 SELF CARE MNGMENT TRAINING: CPT | Mod: GO | Performed by: OCCUPATIONAL THERAPIST

## 2023-12-31 PROCEDURE — 97161 PT EVAL LOW COMPLEX 20 MIN: CPT | Mod: GP

## 2023-12-31 PROCEDURE — 36415 COLL VENOUS BLD VENIPUNCTURE: CPT

## 2023-12-31 PROCEDURE — 85027 COMPLETE CBC AUTOMATED: CPT

## 2023-12-31 PROCEDURE — 97166 OT EVAL MOD COMPLEX 45 MIN: CPT | Mod: GO | Performed by: OCCUPATIONAL THERAPIST

## 2023-12-31 PROCEDURE — 97116 GAIT TRAINING THERAPY: CPT | Mod: GP

## 2023-12-31 PROCEDURE — 99232 SBSQ HOSP IP/OBS MODERATE 35: CPT | Performed by: HOSPITALIST

## 2023-12-31 PROCEDURE — 250N000013 HC RX MED GY IP 250 OP 250 PS 637

## 2023-12-31 PROCEDURE — 250N000013 HC RX MED GY IP 250 OP 250 PS 637: Performed by: HOSPITALIST

## 2023-12-31 PROCEDURE — 120N000001 HC R&B MED SURG/OB

## 2023-12-31 RX ORDER — ASPIRIN 81 MG/1
81 TABLET, CHEWABLE ORAL DAILY
Status: DISCONTINUED | OUTPATIENT
Start: 2023-12-31 | End: 2024-01-03 | Stop reason: HOSPADM

## 2023-12-31 RX ORDER — GABAPENTIN 300 MG/1
300 CAPSULE ORAL AT BEDTIME
Status: COMPLETED | OUTPATIENT
Start: 2023-12-31 | End: 2024-01-02

## 2023-12-31 RX ORDER — GABAPENTIN 300 MG/1
300 CAPSULE ORAL 2 TIMES DAILY
Status: DISCONTINUED | OUTPATIENT
Start: 2024-01-03 | End: 2024-01-03 | Stop reason: HOSPADM

## 2023-12-31 RX ORDER — GABAPENTIN 300 MG/1
300 CAPSULE ORAL 3 TIMES DAILY
Status: DISCONTINUED | OUTPATIENT
Start: 2024-01-06 | End: 2024-01-03 | Stop reason: HOSPADM

## 2023-12-31 RX ADMIN — ATORVASTATIN CALCIUM 80 MG: 40 TABLET, FILM COATED ORAL at 22:01

## 2023-12-31 RX ADMIN — OXYCODONE HYDROCHLORIDE 5 MG: 5 TABLET ORAL at 06:40

## 2023-12-31 RX ADMIN — SENNOSIDES AND DOCUSATE SODIUM 1 TABLET: 8.6; 5 TABLET ORAL at 10:40

## 2023-12-31 RX ADMIN — GABAPENTIN 300 MG: 300 CAPSULE ORAL at 22:01

## 2023-12-31 RX ADMIN — OXYCODONE HYDROCHLORIDE 5 MG: 5 TABLET ORAL at 22:14

## 2023-12-31 RX ADMIN — METOPROLOL TARTRATE 12.5 MG: 25 TABLET, FILM COATED ORAL at 22:02

## 2023-12-31 RX ADMIN — POLYETHYLENE GLYCOL 3350 17 G: 17 POWDER, FOR SOLUTION ORAL at 10:41

## 2023-12-31 RX ADMIN — SENNOSIDES AND DOCUSATE SODIUM 1 TABLET: 8.6; 5 TABLET ORAL at 22:02

## 2023-12-31 RX ADMIN — PANTOPRAZOLE SODIUM 40 MG: 40 TABLET, DELAYED RELEASE ORAL at 06:40

## 2023-12-31 RX ADMIN — ACETAMINOPHEN 975 MG: 325 TABLET ORAL at 06:39

## 2023-12-31 RX ADMIN — ACETAMINOPHEN 975 MG: 325 TABLET ORAL at 14:55

## 2023-12-31 RX ADMIN — METHOCARBAMOL TABLETS 750 MG: 750 TABLET, COATED ORAL at 18:01

## 2023-12-31 RX ADMIN — THERA TABS 1 TABLET: TAB at 10:40

## 2023-12-31 RX ADMIN — ACETAMINOPHEN 975 MG: 325 TABLET ORAL at 22:02

## 2023-12-31 RX ADMIN — METHOCARBAMOL TABLETS 750 MG: 750 TABLET, COATED ORAL at 14:55

## 2023-12-31 RX ADMIN — ENOXAPARIN SODIUM 40 MG: 100 INJECTION SUBCUTANEOUS at 14:55

## 2023-12-31 RX ADMIN — ASPIRIN 81 MG CHEWABLE TABLET 81 MG: 81 TABLET CHEWABLE at 18:01

## 2023-12-31 RX ADMIN — BENZOCAINE AND MENTHOL 1 LOZENGE: 15; 3.6 LOZENGE ORAL at 06:44

## 2023-12-31 RX ADMIN — METHOCARBAMOL TABLETS 750 MG: 750 TABLET, COATED ORAL at 10:40

## 2023-12-31 ASSESSMENT — ACTIVITIES OF DAILY LIVING (ADL)
ADLS_ACUITY_SCORE: 28
ADLS_ACUITY_SCORE: 28
ADLS_ACUITY_SCORE: 29
ADLS_ACUITY_SCORE: 29
ADLS_ACUITY_SCORE: 28
ADLS_ACUITY_SCORE: 28
ADLS_ACUITY_SCORE: 29
ADLS_ACUITY_SCORE: 28
ADLS_ACUITY_SCORE: 29
ADLS_ACUITY_SCORE: 29

## 2023-12-31 NOTE — PROGRESS NOTES
12/31/23 3788   Appointment Info   Signing Clinician's Name / Credentials (OT) Yesenia Laurent OTR/L   Rehab Comments (OT) Pt directed OT evaluation with what he was willing to do this morning.   Living Environment   People in Home alone   Current Living Arrangements apartment  (Elevator will not be workng in March)   Home Accessibility no concerns   Living Environment Comments Bed is a mattress on the floor.   Self-Care   Usual Activity Tolerance moderate   Current Activity Tolerance fair   Activity/Exercise/Self-Care Comment Pt IND w/ ADLs and IADLs at baseline   General Information   Onset of Illness/Injury or Date of Surgery 12/29/23   Referring Physician MICHAEL Meyer   Patient/Family Therapy Goal Statement (OT) go home fully independent   Existing Precautions/Restrictions brace worn when out of bed;spinal   Cognitive Status Examination   Orientation Status orientation to person, place and time   Affect/Mental Status (Cognitive) WNL   Visual Perception   Visual Impairment/Limitations WFL   Sensory   Sensory Quick Adds sensation intact   Pain Assessment   Patient Currently in Pain Yes, see Vital Sign flowsheet   Posture   Posture not impaired   Range of Motion Comprehensive   General Range of Motion no range of motion deficits identified   Strength Comprehensive (MMT)   General Manual Muscle Testing (MMT) Assessment no strength deficits identified   Bed Mobility   Bed Mobility scooting/bridging;supine-sit;sit-supine   Comment (Bed Mobility) Mod indep using bed rails, HOB elevated. Elevated feet to position self in bed.   Transfers   Transfers sit-stand transfer   Transfer Comments SBA; dictated that he would only stand from the bed and take a few steps with the walker, which he did with SBA. Declined toilet transfer, shower transfer.   Activities of Daily Living   BADL Assessment/Intervention other (see comments)  (Pt declined to engage in any dressing activities at OT eval.)   Clinical Impression   Criteria for  Skilled Therapeutic Interventions Met (OT) Yes, treatment indicated;Other (see comments)  (Pt with limited skilled need/readiness to learn, but OT will attempt one more session for education on ADL techniques within back precautions.)   OT Diagnosis decreased ADLs   Influenced by the following impairments lumbar fusion   OT Problem List-Impairments impacting ADL activity tolerance impaired;mobility;pain;post-surgical precautions   Assessment of Occupational Performance 3-5 Performance Deficits   Identified Performance Deficits dressing, bed mobility, all transfers, bathing, toileting   Planned Therapy Interventions (OT) ADL retraining;bed mobility training;transfer training   Clinical Decision Making Complexity (OT) detailed assessment/moderate complexity   Risk & Benefits of therapy have been explained evaluation/treatment results reviewed;patient   OT Total Evaluation Time   OT Eval, Moderate Complexity Minutes (83622) 10   OT Goals   OT Goals Lower Body Dressing;Bed Mobility;Transfers;Toilet Transfer/Toileting   OT: Lower Body Dressing Modified independent;using adaptive equipment;within precautions   OT: Bed Mobility Modified independent;supine to/from sitting;within precautions   OT: Transfer Modified independent;with assistive device;within precautions  (Shower transfer)   OT: Toilet Transfer/Toileting Modified independent;toilet transfer;within precautions   Interventions   Interventions Quick Adds Self-Care/Home Management   Self-Care/Home Management   Self-Care/Home Mgmt/ADL, Compensatory, Meal Prep Minutes (88921) 25   Symptoms Noted During/After Treatment (Meal Preparation/Planning Training) increased pain;dizziness;fatigue   Treatment Detail/Skilled Intervention Pt educated on spinal precautions; talked through logroll technique with bed flat - pt declined to attempt this today but vrebalized understanding. Educated on safe transfer from floor mat (where he sleeps). Pt educated on using a sturdy piece of  furniture if he needs something to pull up on instead of on a walker or folding chair. He declined dressing today; educated on figure four position. He declined toilet transfer as he has a smith, despite being educated that his smith will likely be removed soon. He independently donned his back brace while seated on EOB. Requested OT did not assist as he lives alone.   OT Discharge Planning   OT Plan One more visit to address LB dressing, toilet & shower transfer IF PT AGREEABLE.   OT Discharge Recommendation (DC Rec) home   OT Rationale for DC Rec Pt prefers to direct his own care and is showing limited need for skilled OT services. Will address LB dressing, transfers if pt agreeable.   OT Brief overview of current status SBA for STS, took several steps and returned to bed. Declined most ADL activity   Total Session Time   Timed Code Treatment Minutes 25   Total Session Time (sum of timed and untimed services) 35

## 2023-12-31 NOTE — PLAN OF CARE
Patient A&O x4 and pleasant. VSS on RA. Pain is managed with PO tylenol and robaxin. Patient ambulates Ax1 with belt and walker. PIV is patent and saline locked. Tolerating oral intake and voiding appropriately, smith removed this shift. Last BM on 12/29. Patient resting in bed. Patient is safe, will continue to monitor.    Luli Esquivel, RN      Problem: Adult Inpatient Plan of Care  Goal: Optimal Comfort and Wellbeing  Outcome: Progressing     Problem: Spinal Surgery  Goal: Absence of Bleeding  Outcome: Progressing  Goal: Optimal Functional Ability  Outcome: Progressing  Intervention: Optimize Functional Status  Recent Flowsheet Documentation  Taken 12/31/2023 0900 by Luli Esquivel, RN  Activity Management: activity adjusted per tolerance  Positioning/Transfer Devices:   pillows   in use  Goal: Optimal Pain Control and Function  Outcome: Progressing     Problem: Anxiety Signs/Symptoms  Goal: Improved Sleep (Anxiety Signs/Symptoms)  Outcome: Progressing  Flowsheets (Taken 12/31/2023 1304)  Mutually Determined Action Steps (Improved Sleep):   identifies disturbance factors   uses relaxation techniques   Goal Outcome Evaluation:

## 2023-12-31 NOTE — PROGRESS NOTES
Hemoglobin checked   12.4 ; no significant decrease compared to earlier this morning    Plan- continue to monitor, try to get more rest tonight, pain management, brace out of bed, light activity     Dr. Meyer in agreement   RN in agreement     Alisha Nugent PA-C  [facility] Neurosurgery  [address] [address]    Tel [phone]  Pager [phone]

## 2023-12-31 NOTE — PLAN OF CARE
Patient vital signs are at baseline: Yes  Patient able to ambulate as they were prior to admission or with assist devices provided by therapies during their stay:  No,  Reason:  declined getting up and ambulating until tomorrow  Patient MUST void prior to discharge:  No,  Reason:  Hu still in place d/t patient not getting up out of bed  Patient able to tolerate oral intake:  Yes  Pain has adequate pain control using Oral analgesics:  Yes - declined robaxin at HS  Does patient have an identified :  Yes  Has goal D/C date and time been discussed with patient:  Yes     Willing to work with therapy at his own pace. He stated that he wants to try to get to the side of the bed, use the brace, and possibly stand at the side of bed at this time with therapy. Pleasant otherwise, got some sleep. Pain has been controlled, though as pt states he hasn't been up walking. Hu patent at this time. VSS.    Problem: Spinal Surgery  Goal: Optimal Coping with Surgery  Outcome: Progressing  Goal: Absence of Bleeding  Outcome: Progressing  Goal: Nausea and Vomiting Relief  Outcome: Progressing  Goal: Effective Oxygenation and Ventilation  Outcome: Progressing     Problem: Anxiety Signs/Symptoms  Goal: Improved Mood Symptoms (Anxiety Signs/Symptoms)  Outcome: Progressing

## 2023-12-31 NOTE — PROGRESS NOTES
12/31/23 0810   Appointment Info   Signing Clinician's Name / Credentials (PT) Marisabel Ferrara PT DPT [facility]   Living Environment   People in Home alone   Current Living Arrangements apartment   Transportation Anticipated family or friend will provide   Living Environment Comments mattress on floor; building has elevator (will not be operation beginning in March), lives on 3rd floor   Self-Care   Usual Activity Tolerance moderate   Current Activity Tolerance fair   Fall history within last six months no   Activity/Exercise/Self-Care Comment FWW/crutches   General Information   Onset of Illness/Injury or Date of Surgery 12/29/23   Referring Physician Marie SORIANO   Patient/Family Therapy Goals Statement (PT) goal to return to apartment   Pertinent History of Current Problem (include personal factors and/or comorbidities that impact the POC) s/p L3-4; L 4-5. L5-S1 ALIF on 12/29/23.   Existing Precautions/Restrictions brace worn when out of bed   Cognition   Affect/Mental Status (Cognition) WNL   Orientation Status (Cognition) oriented x 4   Follows Commands (Cognition) WNL   Pain Assessment   Patient Currently in Pain Yes, see Vital Sign flowsheet   Range of Motion (ROM)   ROM Comment B LE grossly WNL   Strength (Manual Muscle Testing)   Strength Comments B LE grossly > 3/5   Bed Mobility   Bed Mobility supine-sit-supine   Supine-Sit-Supine [place] (Bed Mobility) supervision;verbal cues  (flat bed/no rail)   Transfers   Transfers sit-stand transfer   Sit-Stand Transfer   Sit-Stand [place] (Transfers) supervision   Assistive Device (Sit-Stand Transfers) walker, front-wheeled   Gait/Stairs (Locomotion)   [place] Level (Gait) contact guard   Assistive Device (Gait) walker, front-wheeled   Distance in Feet (Gait) 10 ft   Clinical Impression   Criteria for Skilled Therapeutic Intervention Yes, treatment indicated   PT Diagnosis (PT) impaired functional mobility   Influenced by the following  impairments weakness, pain   Functional limitations due to impairments impaired functional mobility   Clinical Presentation (PT Evaluation Complexity) stable   Clinical Presentation Rationale pt presents as medically diagnosed   Clinical Decision Making (Complexity) low complexity   Planned Therapy Interventions (PT) transfer training;gait training;stair training   Risk & Benefits of therapy have been explained evaluation/treatment results reviewed;care plan/treatment goals reviewed;risks/benefits reviewed;current/potential barriers reviewed;participants voiced agreement with care plan;participants included;patient   PT Total Evaluation Time   PT Eval, Low Complexity Minutes (72489) 15   Physical Therapy Goals   PT Frequency 2x/day   PT Predicted Duration/Target Date for Goal Attainment 01/04/24   PT Goals Transfers;Gait   PT: Transfers Supervision/stand-by assist;Sit to/from stand;Assistive device   PT: Gait Modified independent;Rolling walker;Greater than 200 feet   Interventions   Interventions Quick Adds Gait Training;Therapeutic Activity;Therapeutic Procedure   Therapeutic Activity   Therapeutic Activities: dynamic activities to improve functional performance Minutes (18242) 10   Treatment Detail/Skilled Intervention Patient in bed with brace on when approached for PT.  Initially expressed deaire to do PT later, but advised that likely unable to return due to schedule/staffing level. Patient agreeable to participate. Pt performed supine to/from sit via log roll with flat bed position/no rail and cues for technique.  Sit <> stand using FWW with Supervision. Patient returned to bed with bed alarm on at end of session.   Gait Training   Gait Training Minutes (65232) 13   Symptoms Noted During/After Treatment (Gait Training) none   Treatment Detail/Skilled Intervention Assist with ambulation for safety and for correct management of FWW (push vs lifting) and to avoid obstacles.  Gait speed increased as distance  increased.   Distance in Feet 300 ft   [place] Level (Gait Training) contact guard   Physical Assistance Level (Gait Training) 1 person assist   Assistive Device (Gait Training) rolling walker   Gait Analysis Deviations decreased dustin;decreased step length   Impairments (Gait Analysis/Training) pain;strength decreased   PT Discharge Planning   PT Plan gait, transfers   PT Discharge Recommendation (DC Rec) home with outpatient physical therapy  (Out Patient PT per surgeon)   PT Rationale for DC Rec transfers/gait with FWW and CGA for safety   PT Brief overview of current status Able to get in/out of bed with safe technique.  Sit <> stand from elevated bed surface--mattress in low, so may have difficulty transfering in/out of bed at home; able to ambulate 300 ft with FWW and CGA for walker steering/avoidance of obstacle.   PT Equipment Needed at Discharge walker, rolling   Total Session Time   Timed Code Treatment Minutes 23   Total Session Time (sum of timed and untimed services) 38

## 2023-12-31 NOTE — PLAN OF CARE
Problem: Spinal Surgery  Goal: Optimal Coping with Surgery  Outcome: Progressing   Goal Outcome Evaluation:       Patient appreciated telephone conversation with Spine PA this evening. He is accepting of his latest hemoglobin. He continues to decline offers to ambulate or get up to chair, stating that he will do so tomorrow, when he will have had more rest. Trazodone ordered prn sleep tonight. He requested 10mg Oxycodone with his scheduled dose of Methocarbamol (given late because he was on the phone and asked the nurse to come back later).

## 2023-12-31 NOTE — PROGRESS NOTES
[facility]    Medicine Progress Note - Hospitalist Service    Date of Admission:  12/29/2023    Assessment & Plan   Raoul Henry is a 68 year old male admitted s/p lumbar fusion.  He is clinically stable.  Recommendations as below.    # s/p lumbar fusion  - per orthopaedics     # CAD, prior MI  - resume ASA when cleared by surgery  - statin, metoprolol     # Hx of PE  - resume apixaban when cleared by surgery  - previous note documents patient had evaluation with hematology and decided to continue lifelong anticoagulation             Diet: Advance Diet as Tolerated: Regular Diet Adult      Smith Catheter: PRESENT, indication: Anesthesia  Lines: None     Cardiac Monitoring: None  Code Status: Full Code      Clinically Significant Risk Factors                  # Hypertension: Noted on problem list        # Overweight: Estimated body mass index is 28.21 kg/m  as calculated from the following:    Height as of this encounter: 1.829 m (6').    Weight as of this encounter: 94.3 kg (208 lb)., PRESENT ON ADMISSION     # Asthma: noted on problem list        Disposition Plan      Expected Discharge Date: 01/01/2024                    Vinnie Kirkpatrick MD  Hospitalist Service  [facility]  Securely message with [employer] (more info)  Text page via [employer] Paging/Directory   ______________________________________________________________________    Interval History   Feeling better today.  Denies chest pain/pressure dyspnea.  Reports pain at the abdominal incision.  Denies numbness or tingling in the legs.  He is passing gas.  His smith catheter was just removed.    Physical Exam   Vital Signs: Temp: 98.7  F (37.1  C) Temp src: Oral BP: 130/77 Pulse: 65   Resp: 16 SpO2: 98 % O2 Device: None (Room air) Oxygen Delivery: 1 LPM  Weight: 208 lbs 0 oz    Gen: lying in bed in no acute distress  Neuro:  alert, conversant;  moves toes in both feet, sensation grossly intact in both feet  CV:  nl  rate, regular rhythm  Pulm:  no acute resp distress, ctab anteriorly  GI:  abdomen soft, non tender outside of abdominal dressing, mild pain near inferior aspect of dressing    Medical Decision Making             Data   Reviewed:    WBC 7.7  Hgb 12  Plts 142

## 2023-12-31 NOTE — PROGRESS NOTES
Updates --    Checked in with nurse before shift change and patient had a good day overall. Pain has been controlled, participated in therapies, better day overall which is encouraging.     Gabapentin ordered per patient request to assist with symptoms as gabapentin helped in the past. Placed identical orders to when he was started on gabapentin dated 7/2023. 300 mg tablets to be titrated up to 1 tablets 3 times a day as tolerated.     ASA will resume 48 hours post operatively    Eliquis will resume 72 hours post operatively    Dr. Meyer in agreement with plans    Alisha SERRA [facility] Neurosurgery  [address] [address]

## 2023-12-31 NOTE — PROGRESS NOTES
[facility]    Neurosurgery  Daily Note    Assessment & Plan   Procedure(s):  lumbar 3-lumbar 4, lumbar 4-lumbar 5 and lumbar 5 sacral 1 anterior lumbar interbody fusion with use of bone morphogenic protein and lumbar 3-sacral 1 posterior minimally invasive instrumented fusion with use of stealth  SURGICAL EXPOSURE, ANTERIOR APPROACH, WITH WOUND CLOSURE, FOR LUMBAR SPINE SURGERY, BY GENERAL SURGERY   2 Days Post-Op    AM ROUNDS: BLE feel significantly improved compared to pre operatively. Admits getting up and moving felt much better compared to prior to surgery. Denies NEW radicular symptoms, paresthesias, weakness, bowel/bladder changes. Dressings are intact and stable compared to yesterday. Brace arrived and in place. Did get more sleep overnight. Patient is still anxious about anterior portion of procedure and tear in vein- reviewed in detail with him how tear occurred per Dr. Meyer and that it was sutured and repaired with Dr. Ojeda. Patient stays  he will feel better with an abdominal image before he goes home so he will not have to worry.      Hemoglobin stable at 11.9 this AM     Plan:  -Advance activity as tolerated. Brace when out of bed and OFF with resting and hygiene. Patient hesitant to ambulate without brace. Once brace obtained, try to get patient ambulating to assist with DVT PPX  -Continue supportive and symptomatic treatment  -Start or continue physical therapy  -Pain control measures  -Lovenox. ASA may begin 48 hours post op. Eliquis may resume 72 hours post op as patient has history of PE and MI   -ABX post op completed  -Bowel regimen  -Advance diet as tolerated  -Routine wound care  -Hospitalist and Orthotics consults appreciated   -We can obtain abdominal CT prior to discharge   -Lumbar XR when able  -Hospitalist, orthotics, PT, OT consults appreciated     Plans reviewed with patient, RN, Dr. Mitch Nugent PA-C  [facility] Neurosurgery  [place]  [address]  [address]  [place] [address]    Tel [phone]  Pager [phone]    Principal Problem:    S/P lumbar fusion     Alisha Nugent PA-C    Interval History   Stable     Physical Exam   Temp: 98.7  F (37.1  C) Temp src: Oral BP: 130/77 Pulse: 65   Resp: 16 SpO2: 98 % O2 Device: None (Room air) Oxygen Delivery: 1 LPM  Vitals:    12/29/23 1138   Weight: 94.3 kg (208 lb)     Vital Signs with Ranges  Temp:  [97.3  F (36.3  C)-99.1  F (37.3  C)] 98.7  F (37.1  C)  Pulse:  [65-99] 65  Resp:  [16-18] 16  BP: (130-142)/(76-82) 130/77  SpO2:  [98 %-100 %] 98 %  I/O last 3 completed shifts:  In: -   Out: 2400 [Urine:2400]    Awake, alert, appropriate   Moving BLE symmetrically   Sensation intact   Negative clonus     Anterior and posterior dressing in place and stable compared to yesterday. No new or ongoing drainage.       Medications    sodium chloride 75 mL/hr at 12/29/23 2118       acetaminophen  975 mg Oral Q8H    atorvastatin  80 mg Oral At Bedtime    enoxaparin ANTICOAGULANT  40 mg Subcutaneous Q24H    heparin (porcine) 30,000 Units in sodium chloride 0.9 % 1,030 mL PERFUSION solution   PERFUSION Once    methocarbamol  750 mg Oral 4x Daily    metoprolol tartrate  12.5 mg Oral BID    multivitamin, therapeutic  1 tablet Oral Daily    pantoprazole  40 mg Oral QAM AC    polyethylene glycol  17 g Oral Daily    senna-docusate  1 tablet Oral BID       Plans discussed with Dr. Meyer who was in agreement with plans    Alisha Nugent PA-C  [facility] Neurosurgery  [address]  [address]  [place] [address]    Tel [phone]  Pager [phone]

## 2023-12-31 NOTE — PLAN OF CARE
Patient vital signs are at baseline: Yes  Patient able to ambulate as they were prior to admission or with assist devices provided by therapies during their stay:  No,  Reason:  declined getting up and ambulating until tomorrow  Patient MUST void prior to discharge:  No,  Reason:  Hu still in place d/t patient not getting up out of bed  Patient able to tolerate oral intake:  Yes  Pain has adequate pain control using Oral analgesics:  Yes - declined robaxin at HS  Does patient have an identified :  Yes  Has goal D/C date and time been discussed with patient:  Yes   Problem: Spinal Surgery  Goal: Optimal Coping with Surgery  Outcome: Progressing  Goal: Absence of Bleeding  Outcome: Progressing  Goal: Nausea and Vomiting Relief  Outcome: Progressing  Goal: Effective Oxygenation and Ventilation  Outcome: Progressing     Problem: Anxiety Signs/Symptoms  Goal: Improved Mood Symptoms (Anxiety Signs/Symptoms)  Outcome: Progressing

## 2024-01-01 ENCOUNTER — APPOINTMENT (OUTPATIENT)
Dept: PHYSICAL THERAPY | Facility: CLINIC | Age: 69
DRG: 460 | End: 2024-01-01
Attending: SURGERY
Payer: COMMERCIAL

## 2024-01-01 PROCEDURE — 120N000001 HC R&B MED SURG/OB

## 2024-01-01 PROCEDURE — 250N000013 HC RX MED GY IP 250 OP 250 PS 637: Performed by: PHYSICIAN ASSISTANT

## 2024-01-01 PROCEDURE — 97530 THERAPEUTIC ACTIVITIES: CPT | Mod: GP

## 2024-01-01 PROCEDURE — 97116 GAIT TRAINING THERAPY: CPT | Mod: GP

## 2024-01-01 PROCEDURE — 250N000013 HC RX MED GY IP 250 OP 250 PS 637

## 2024-01-01 PROCEDURE — 97110 THERAPEUTIC EXERCISES: CPT | Mod: GP

## 2024-01-01 PROCEDURE — 250N000013 HC RX MED GY IP 250 OP 250 PS 637: Performed by: HOSPITALIST

## 2024-01-01 RX ADMIN — APIXABAN 5 MG: 5 TABLET, FILM COATED ORAL at 18:15

## 2024-01-01 RX ADMIN — METHOCARBAMOL TABLETS 750 MG: 750 TABLET, COATED ORAL at 09:36

## 2024-01-01 RX ADMIN — METHOCARBAMOL TABLETS 750 MG: 750 TABLET, COATED ORAL at 14:02

## 2024-01-01 RX ADMIN — METHOCARBAMOL TABLETS 750 MG: 750 TABLET, COATED ORAL at 17:07

## 2024-01-01 RX ADMIN — GABAPENTIN 300 MG: 300 CAPSULE ORAL at 22:24

## 2024-01-01 RX ADMIN — ACETAMINOPHEN 975 MG: 325 TABLET ORAL at 17:07

## 2024-01-01 RX ADMIN — PANTOPRAZOLE SODIUM 40 MG: 40 TABLET, DELAYED RELEASE ORAL at 09:35

## 2024-01-01 RX ADMIN — ACETAMINOPHEN 975 MG: 325 TABLET ORAL at 09:41

## 2024-01-01 RX ADMIN — OXYCODONE HYDROCHLORIDE 5 MG: 5 TABLET ORAL at 09:36

## 2024-01-01 RX ADMIN — ATORVASTATIN CALCIUM 80 MG: 40 TABLET, FILM COATED ORAL at 22:24

## 2024-01-01 RX ADMIN — METHOCARBAMOL TABLETS 750 MG: 750 TABLET, COATED ORAL at 22:24

## 2024-01-01 RX ADMIN — THERA TABS 1 TABLET: TAB at 09:35

## 2024-01-01 RX ADMIN — POLYETHYLENE GLYCOL 3350 17 G: 17 POWDER, FOR SOLUTION ORAL at 09:35

## 2024-01-01 RX ADMIN — OXYCODONE HYDROCHLORIDE 5 MG: 5 TABLET ORAL at 23:48

## 2024-01-01 RX ADMIN — SENNOSIDES AND DOCUSATE SODIUM 1 TABLET: 8.6; 5 TABLET ORAL at 09:36

## 2024-01-01 RX ADMIN — METOPROLOL TARTRATE 12.5 MG: 25 TABLET, FILM COATED ORAL at 22:23

## 2024-01-01 RX ADMIN — ASPIRIN 81 MG CHEWABLE TABLET 81 MG: 81 TABLET CHEWABLE at 09:35

## 2024-01-01 ASSESSMENT — ACTIVITIES OF DAILY LIVING (ADL)
ADLS_ACUITY_SCORE: 28
ADLS_ACUITY_SCORE: 29
ADLS_ACUITY_SCORE: 29
ADLS_ACUITY_SCORE: 28
ADLS_ACUITY_SCORE: 29
ADLS_ACUITY_SCORE: 28
ADLS_ACUITY_SCORE: 28
ADLS_ACUITY_SCORE: 29
ADLS_ACUITY_SCORE: 28
ADLS_ACUITY_SCORE: 29

## 2024-01-01 NOTE — PROGRESS NOTES
Patient vital signs are at baseline: Yes  Patient able to ambulate as they were prior to admission or with assist devices provided by therapies during their stay:  Yes  Patient MUST void prior to discharge:  No,  Reason:  One more PVR needed to pass bladder trial  Patient able to tolerate oral intake:  Yes  Pain has adequate pain control using Oral analgesics:  Yes  Does patient have an identified :  Yes  Has goal D/C date and time been discussed with patient:  Yes     Pt doing well. Pleasant. Stated ambulating went well. Calls appropriately. Last given 5 mg oxycodone before bed. Declined robaxin, trazadone at HS. No c/o pain throughout the night.

## 2024-01-01 NOTE — PROGRESS NOTES
Brief staff note    Chart review only.  Vitals unremarkable.  No labs or new imaging today.  No bowel movement documented in flow sheet.  Patient is on miralax and senna/docusate.

## 2024-01-01 NOTE — PROGRESS NOTES
[facility]    Neurosurgery  Daily Note    Assessment & Plan   Procedure(s):  lumbar 3-lumbar 4, lumbar 4-lumbar 5 and lumbar 5 sacral 1 anterior lumbar interbody fusion with use of bone morphogenic protein and lumbar 3-sacral 1 posterior minimally invasive instrumented fusion with use of stealth  SURGICAL EXPOSURE, ANTERIOR APPROACH, WITH WOUND CLOSURE, FOR LUMBAR SPINE SURGERY, BY GENERAL SURGERY   3 Days Post-Op    AM ROUNDS: doing well. Pain has been well controlled. Ambulating. Denies BLE symptoms. Voiding and passing gas. Dressings are stable. Exam stable. Patient prefers to have abdominal CT and XR closer to discharge so he can continue working with therapies.      Plan:  -Advance activity as tolerated. Brace when out of bed and OFF with resting and hygiene.   -Continue supportive and symptomatic treatment  -Start or continue physical therapy  -Pain control measures  -Lovenox will be STOPPED tonight as eliquis will resume tonight. ASA started 48 hours post op. Eliquis may resume 72 hours post op as patient has history of PE and MI. Orders in.   -ABX post op completed  -Bowel regimen  -Advance diet as tolerated  -Routine wound care  -Hospitalist and Orthotics consults appreciated   -We can obtain abdominal CT prior to discharge   -Lumbar XR and abdominal CT before discharge  -Hospitalist, orthotics, PT, OT consults appreciated    Dr. Meyer in agreement     Alisha Nugent PA-C  [facility] [place] [address]    Tel [phone]  Pager [phone]    Principal Problem:    S/P lumbar fusion     Alisha Nugent PA-C    Interval History   Stable     Physical Exam   Temp: 98.5  F (36.9  C) Temp src: Oral BP: (!) 140/88 Pulse: 83   Resp: 16 SpO2: 98 % O2 Device: None (Room air)    Vitals:    12/29/23 1138   Weight: 94.3 kg (208 lb)     Vital Signs with Ranges  Temp:  [98.5  F (36.9  C)-99.2  F (37.3  C)] 98.5   F (36.9  C)  Pulse:  [] 83  Resp:  [16] 16  BP: (128-140)/(75-88) 140/88  SpO2:  [98 %] 98 %  I/O last 3 completed shifts:  In: 100 [P.O.:100]  Out: 2300 [Urine:2300]    Awake, alert, appropriate   Dressings are stable anterior and posterior  5/5 BLE, feels RLE getting stronger   Sensation intact BLE           Medications    sodium chloride 75 mL/hr at 12/29/23 2118       acetaminophen  975 mg Oral Q8H    aspirin  81 mg Oral Daily    atorvastatin  80 mg Oral At Bedtime    enoxaparin ANTICOAGULANT  40 mg Subcutaneous Q24H    gabapentin  300 mg Oral At Bedtime    Followed by    [START ON 1/3/2024] gabapentin  300 mg Oral BID    Followed by    [START ON 1/6/2024] gabapentin  300 mg Oral TID    heparin (porcine) 30,000 Units in sodium chloride 0.9 % 1,030 mL PERFUSION solution   PERFUSION Once    methocarbamol  750 mg Oral 4x Daily    metoprolol tartrate  12.5 mg Oral At Bedtime    multivitamin, therapeutic  1 tablet Oral Daily    pantoprazole  40 mg Oral QAM AC    polyethylene glycol  17 g Oral Daily    senna-docusate  1 tablet Oral BID       Plans discussed with Dr. Meyer who was in agreement with plans    Alisha SERRA [facility] Neurosurgery  [address] [address]    Tel [phone]  Pager [phone]

## 2024-01-01 NOTE — PLAN OF CARE
Problem: Adult Inpatient Plan of Care  Goal: Plan of Care Review  Description: The Plan of Care Review/Shift note should be completed every shift.  The Outcome Evaluation is a brief statement about your assessment that the patient is improving, declining, or no change.  This information will be displayed automatically on your shift  note.  Outcome: Progressing   Goal Outcome Evaluation:  Patient vital signs are at baseline: Yes  Patient able to ambulate as they were prior to admission or with assist devices provided by therapies during their stay:  Yes  Patient MUST void prior to discharge:  Yes  Patient able to tolerate oral intake:  Yes  Pain has adequate pain control using Oral analgesics:  Yes  Does patient have an identified :  Yes  Has goal D/C date and time been discussed with patient:  Yes  ----------  Pain controlled with oral meds. Ambulating independently with walker + lumbar corset and tolerating. Reported multiple loose bowel movements after Miralax, Senna, and prune juice. Completed x3 PVRs.

## 2024-01-01 NOTE — PLAN OF CARE
Problem: Spinal Surgery  Goal: Optimal Pain Control and Function  Outcome: Progressing  Intervention: Prevent or Manage Pain  Recent Flowsheet Documentation  Taken 12/31/2023 3121 by Marie Sky, RN  Pain Management Interventions:   medication (see MAR)   pillow support provided   rest   repositioned     A/o x 4, VSS on RA. Pain managed with repositioning and medications per MAR. Anterior dressing marked previously with no change. Posterior dressing with scant drainage on left bandage, marked. X 3 PVR's needed, pt aware.

## 2024-01-02 ENCOUNTER — APPOINTMENT (OUTPATIENT)
Dept: RADIOLOGY | Facility: CLINIC | Age: 69
DRG: 460 | End: 2024-01-02
Payer: COMMERCIAL

## 2024-01-02 ENCOUNTER — APPOINTMENT (OUTPATIENT)
Dept: PHYSICAL THERAPY | Facility: CLINIC | Age: 69
DRG: 460 | End: 2024-01-02
Attending: SURGERY
Payer: COMMERCIAL

## 2024-01-02 ENCOUNTER — APPOINTMENT (OUTPATIENT)
Dept: CT IMAGING | Facility: CLINIC | Age: 69
DRG: 460 | End: 2024-01-02
Attending: PHYSICIAN ASSISTANT
Payer: COMMERCIAL

## 2024-01-02 LAB
ERYTHROCYTE [DISTWIDTH] IN BLOOD BY AUTOMATED COUNT: 14.3 % (ref 10–15)
HCT VFR BLD AUTO: 33.7 % (ref 40–53)
HGB BLD-MCNC: 11.3 G/DL (ref 13.3–17.7)
MCH RBC QN AUTO: 29.8 PG (ref 26.5–33)
MCHC RBC AUTO-ENTMCNC: 33.5 G/DL (ref 31.5–36.5)
MCV RBC AUTO: 89 FL (ref 78–100)
PLATELET # BLD AUTO: 190 10E3/UL (ref 150–450)
RBC # BLD AUTO: 3.79 10E6/UL (ref 4.4–5.9)
WBC # BLD AUTO: 6.4 10E3/UL (ref 4–11)

## 2024-01-02 PROCEDURE — 250N000013 HC RX MED GY IP 250 OP 250 PS 637: Performed by: PHYSICIAN ASSISTANT

## 2024-01-02 PROCEDURE — 97116 GAIT TRAINING THERAPY: CPT | Mod: GP

## 2024-01-02 PROCEDURE — 120N000001 HC R&B MED SURG/OB

## 2024-01-02 PROCEDURE — 250N000011 HC RX IP 250 OP 636: Performed by: SURGERY

## 2024-01-02 PROCEDURE — 85027 COMPLETE CBC AUTOMATED: CPT | Performed by: PHYSICIAN ASSISTANT

## 2024-01-02 PROCEDURE — 97530 THERAPEUTIC ACTIVITIES: CPT | Mod: GP

## 2024-01-02 PROCEDURE — 250N000013 HC RX MED GY IP 250 OP 250 PS 637

## 2024-01-02 PROCEDURE — 74177 CT ABD & PELVIS W/CONTRAST: CPT

## 2024-01-02 PROCEDURE — 99232 SBSQ HOSP IP/OBS MODERATE 35: CPT | Performed by: HOSPITALIST

## 2024-01-02 PROCEDURE — 250N000009 HC RX 250: Performed by: PHYSICIAN ASSISTANT

## 2024-01-02 PROCEDURE — 250N000013 HC RX MED GY IP 250 OP 250 PS 637: Performed by: HOSPITALIST

## 2024-01-02 PROCEDURE — 36415 COLL VENOUS BLD VENIPUNCTURE: CPT | Performed by: PHYSICIAN ASSISTANT

## 2024-01-02 PROCEDURE — 999N000065 XR LUMBAR SPINE 2/3 VIEWS

## 2024-01-02 RX ORDER — GINSENG 100 MG
CAPSULE ORAL DAILY
Status: DISCONTINUED | OUTPATIENT
Start: 2024-01-02 | End: 2024-01-03 | Stop reason: HOSPADM

## 2024-01-02 RX ORDER — IOPAMIDOL 755 MG/ML
90 INJECTION, SOLUTION INTRAVASCULAR ONCE
Status: COMPLETED | OUTPATIENT
Start: 2024-01-02 | End: 2024-01-02

## 2024-01-02 RX ADMIN — OXYCODONE HYDROCHLORIDE 5 MG: 5 TABLET ORAL at 20:33

## 2024-01-02 RX ADMIN — SENNOSIDES AND DOCUSATE SODIUM 1 TABLET: 8.6; 5 TABLET ORAL at 08:59

## 2024-01-02 RX ADMIN — SENNOSIDES AND DOCUSATE SODIUM 1 TABLET: 8.6; 5 TABLET ORAL at 20:33

## 2024-01-02 RX ADMIN — PANTOPRAZOLE SODIUM 40 MG: 40 TABLET, DELAYED RELEASE ORAL at 09:00

## 2024-01-02 RX ADMIN — BACITRACIN: 500 OINTMENT TOPICAL at 11:33

## 2024-01-02 RX ADMIN — METHOCARBAMOL TABLETS 750 MG: 750 TABLET, COATED ORAL at 13:19

## 2024-01-02 RX ADMIN — GABAPENTIN 300 MG: 300 CAPSULE ORAL at 20:33

## 2024-01-02 RX ADMIN — APIXABAN 5 MG: 5 TABLET, FILM COATED ORAL at 09:00

## 2024-01-02 RX ADMIN — METHOCARBAMOL TABLETS 750 MG: 750 TABLET, COATED ORAL at 08:59

## 2024-01-02 RX ADMIN — ATORVASTATIN CALCIUM 80 MG: 40 TABLET, FILM COATED ORAL at 20:33

## 2024-01-02 RX ADMIN — METOPROLOL TARTRATE 12.5 MG: 25 TABLET, FILM COATED ORAL at 20:33

## 2024-01-02 RX ADMIN — IOPAMIDOL 90 ML: 755 INJECTION, SOLUTION INTRAVENOUS at 12:03

## 2024-01-02 RX ADMIN — METHOCARBAMOL TABLETS 750 MG: 750 TABLET, COATED ORAL at 16:29

## 2024-01-02 RX ADMIN — ASPIRIN 81 MG CHEWABLE TABLET 81 MG: 81 TABLET CHEWABLE at 09:00

## 2024-01-02 RX ADMIN — METHOCARBAMOL TABLETS 750 MG: 750 TABLET, COATED ORAL at 20:33

## 2024-01-02 RX ADMIN — APIXABAN 5 MG: 5 TABLET, FILM COATED ORAL at 20:33

## 2024-01-02 RX ADMIN — THERA TABS 1 TABLET: TAB at 08:59

## 2024-01-02 ASSESSMENT — ACTIVITIES OF DAILY LIVING (ADL)
ADLS_ACUITY_SCORE: 28
ADLS_ACUITY_SCORE: 28
ADLS_ACUITY_SCORE: 29
ADLS_ACUITY_SCORE: 27
ADLS_ACUITY_SCORE: 29
ADLS_ACUITY_SCORE: 27
ADLS_ACUITY_SCORE: 29
ADLS_ACUITY_SCORE: 27
ADLS_ACUITY_SCORE: 27
ADLS_ACUITY_SCORE: 28
ADLS_ACUITY_SCORE: 29
ADLS_ACUITY_SCORE: 28

## 2024-01-02 NOTE — PLAN OF CARE
Problem: Spinal Surgery  Goal: Optimal Pain Control and Function  1/2/2024 1642 by Marie Sky, RN  Outcome: Progressing  1/2/2024 1239 by Marie Sky, RN  Outcome: Progressing  Intervention: Prevent or Manage Pain  Recent Flowsheet Documentation  Taken 1/2/2024 1629 by Marie Sky, RN  Pain Management Interventions:   medication (see MAR)   rest   quiet environment facilitated  Taken 1/2/2024 0850 by Marie Sky, RN  Pain Management Interventions:   medication (see MAR)   rest   repositioned   quiet environment facilitated     A/o x 4, VSS on RA. Pain managed with scheduled medications, rest, and repositioning, back brace when out of bed. Anterior and posterior surgical sites with dry gauze; CDI.  X-ray and CT completed today. Passed PT, able to complete stairs. Bacitracin ordered for skin on forehead. L PIV SL. Tolerating regular diet. Independent in room, calls appropriately.

## 2024-01-02 NOTE — PROGRESS NOTES
[facility]    Medicine Progress Note - Hospitalist Service    Date of Admission:  12/29/2023    Assessment & Plan   Raoul Henry is a 68 year old male admitted s/p lumbar fusion.  He is clinically stable.  Recommendations as below.    # s/p lumbar fusion  - per orthopaedics    # Constipation, resolved     # CAD, prior MI  - on ASA  - statin, metoprolol     # Hx of PE  - on apixaban          Diet: Advance Diet as Tolerated: Regular Diet Adult    Hu Catheter: Not present  Lines: None     Cardiac Monitoring: None  Code Status: Full Code      Clinically Significant Risk Factors                  # Hypertension: Noted on problem list        # Overweight: Estimated body mass index is 27.8 kg/m  as calculated from the following:    Height as of this encounter: 1.829 m (6').    Weight as of this encounter: 93 kg (205 lb).      # Asthma: noted on problem list        Disposition Plan      Expected Discharge Date: 01/02/2024        Discharge Comments: PT - Needs to do stairs            Vinnie Kirkpatrick MD  Hospitalist Service  [facility]  Securely message with [employer] (more info)  Text page via [employer] Paging/Directory   ______________________________________________________________________    Interval History   He is ambulating.  Denies numbness or tingling in the legs.  Denies chest pain/pressure or dyspnea.  Is urinating today.    Physical Exam   Vital Signs: Temp: 98.9  F (37.2  C) Temp src: Oral BP: 135/69 Pulse: 88   Resp: 16 SpO2: 98 % O2 Device: None (Room air)    Weight: 205 lbs 0 oz    Gen:  lying in bed in no extremis  Neuro: alert, conversant  CV:  nl rate, regular rhythm to palpation  Pulm: no acute resp distress, ctab anteriorly  GI:  abdomen soft, tenderness at midline surgical incision only    Medical Decision Making             Data   Reviewed:    WBVC 6.4  Hgb 11  Plts 190

## 2024-01-02 NOTE — PROGRESS NOTES
Physical Therapy Discharge Summary    Reason for therapy discharge:    All goals and outcomes met, no further needs identified.    Progress towards therapy goal(s). See goals on Care Plan in [facility] electronic health record for goal details.  Goals met    Therapy recommendation(s):    Continued therapy is recommended.  Rationale/Recommendations:  OP PT once able.

## 2024-01-02 NOTE — PLAN OF CARE
Patient vital signs are at baseline: Yes  Patient able to ambulate as they were prior to admission or with assist devices provided by therapies during their stay:  Yes - still needs to do stairs with therapy  Patient MUST void prior to discharge:  Yes  Patient able to tolerate oral intake:  Yes  Pain has adequate pain control using Oral analgesics:  Yes  Does patient have an identified :  Yes  Has goal D/C date and time been discussed with patient:  Yes  Pt ambulating in hallway very well. Possibility of discharge today. Needs xray and CT before discharge.     Problem: Adult Inpatient Plan of Care  Goal: Optimal Comfort and Wellbeing  Outcome: Progressing  Goal: Readiness for Transition of Care  Outcome: Progressing     Problem: Spinal Surgery  Goal: Optimal Coping with Surgery  Outcome: Progressing  Goal: Absence of Bleeding  Outcome: Progressing  Goal: Effective Bowel Elimination  Outcome: Progressing  Goal: Optimal Neurologic Function  Outcome: Progressing  Goal: Optimal Pain Control and Function  Outcome: Progressing  Goal: Effective Urinary Elimination  Outcome: Progressing  Goal: Effective Oxygenation and Ventilation  Outcome: Progressing     Problem: Anxiety Signs/Symptoms  Goal: Improved Sleep (Anxiety Signs/Symptoms)  Outcome: Progressing

## 2024-01-02 NOTE — PROGRESS NOTES
[facility] 1/2/2023    Neurosurgery  Daily Note    Assessment & Plan   Procedure(s):  lumbar 3-lumbar 4, lumbar 4-lumbar 5 and lumbar 5 sacral 1 anterior lumbar interbody fusion with use of bone morphogenic protein and lumbar 3-sacral 1 posterior minimally invasive instrumented fusion with use of stealth  SURGICAL EXPOSURE, ANTERIOR APPROACH, WITH WOUND CLOSURE, FOR LUMBAR SPINE SURGERY, BY GENERAL SURGERY   4 Days Post-Op    AM ROUNDS: doing well. Pain has been well controlled with scheduled and prn medications. Ambulated multiple laps this morning and worked on the stairs notes slight increase in posterior incisional pain described as tightness and spasms.  Denies BLE symptoms. Voiding and soft bowel movement yesterday.  Incisions well approximated. Patient prefers to have abdominal CT and XR closer to discharge so he can continue working with therapies.      Plan:  -Advance activity as tolerated. Brace when out of bed and OFF with resting and hygiene.   -Continue supportive and symptomatic treatment  -Start or continue physical therapy  -Pain control measures  -Lovenox will be STOPPED as eliquis will resumed yesterday ASA started 48 hours post op. Eliquis may resume 72 hours post op as patient has history of PE and MI. Orders in.   -ABX post op completed  -Bowel regimen  -Advance diet as tolerated  -Routine wound care  -Hospitalist and Orthotics consults appreciated   -We can obtain abdominal CT prior to discharge   -Lumbar XR and abdominal CT before discharge  -Hospitalist, orthotics, PT, OT consults appreciated  - case management to assist with discharge planning  Tentative discharge 1-2 days     Dr. Meyer in agreement     Principal Problem:    S/P lumbar fusion     Interval History   States that he is doing well has slight complaint of posterior incisional tightness and spasms this morning has he has ambulated 3 laps in the asencio but denies radicular lower extremity pain numbness  tingling or weakness    Physical Exam   /69 (BP Location: Right arm)   Pulse 88   Temp 98.9  F (37.2  C) (Oral)   Resp 16   Ht 1.829 m (6')   Wt 93 kg (205 lb)   SpO2 98%   BMI 27.80 kg/m        Awake, alert, appropriate   Dressings are stable anterior and posterior  5/5 BLE, feels RLE getting stronger   Sensation intact BLE         Medications    sodium chloride 75 mL/hr at 12/29/23 2118       acetaminophen  975 mg Oral Q8H    aspirin  81 mg Oral Daily    atorvastatin  80 mg Oral At Bedtime    enoxaparin ANTICOAGULANT  40 mg Subcutaneous Q24H    gabapentin  300 mg Oral At Bedtime    Followed by    [START ON 1/3/2024] gabapentin  300 mg Oral BID    Followed by    [START ON 1/6/2024] gabapentin  300 mg Oral TID    heparin (porcine) 30,000 Units in sodium chloride 0.9 % 1,030 mL PERFUSION solution   PERFUSION Once    methocarbamol  750 mg Oral 4x Daily    metoprolol tartrate  12.5 mg Oral At Bedtime    multivitamin, therapeutic  1 tablet Oral Daily    pantoprazole  40 mg Oral QAM AC    polyethylene glycol  17 g Oral Daily    senna-docusate  1 tablet Oral BID       Plans discussed with Dr. Meyer who was in agreement with plans    Sarah Mercado PA-C  [facility]surgery  O: [phone]

## 2024-01-02 NOTE — PLAN OF CARE
Problem: Adult Inpatient Plan of Care  Goal: Optimal Comfort and Wellbeing  Outcome: Progressing  Intervention: Monitor Pain and Promote Comfort  Recent Flowsheet Documentation  Taken 1/2/2024 3025 by Marie Sky, RN  Pain Management Interventions:   medication (see MAR)   rest   repositioned   quiet environment facilitated    A/o x 4, VSS on RA. Pain managed with scheduled medications, rest, and repositioning, back brace when out of bed. X-ray and CT completed today. Passed PT, able to complete stairs. Bacitracin ordered for skin on forehead. L PIV SL. Tolerating regular diet. Independent in room, calls appropriately.

## 2024-01-02 NOTE — OP NOTE
NEUROSURGERY OPERATIVE REPORT     DATE OF SERVICE:  12/29/2023    PREOPERATIVE DIAGNOSES:   Lumbar spine instability   Spondylolisthesis of lumbar region     POSTOPERATIVE DIAGNOSES:   same    PROCEDURE:   Stage 1  1. Lumbar 3-lumbar 4, lumbar 4-lumbar 5 and lumbar 5 sacral 1 anterior interbody lumbar fusion with use of allograft (Ines and bone morphogenic protein).  2. Use of general surgery for anterior approach      Stage 2  Lumbar 3-sacral 1  posterior minimally invasive instrumented fusion  Use of stealth navigation     SURGEON: Yaneth Meyer MD    CO-SURGEON:  Bacilio Ojeda MD     ASSISTANT: Marie Dickson PA-C     INDICATIONS:  Raoul Henry is a 67 yo male who presents with low back and bilateral leg pain. MRI of lumbar spine demonstrates moderate stenosis at lumbar 3-4 as well as lumbar 5-sacral 1. He also has moderate foraminal narrowing at lumbar 5-sacral 1 and lumbar 3-4. At lumbar 4-5 he has mild to moderate central stenosis with moderate left foraminal narrowing. In supine position he has 5 mm of anterolisthesis of lumbar 5-sacral 1. In the upright position on x-ray, patient develops anterolisthesis of both lumbar 3-4 and lumbar 4-5.  He continues to have grade 1 anterolisthesis at lumbar 5-sacral 1.  Although there does not appear to be dynamic instability between flexion-extension while upright since he develops anterolisthesis between the supine and upright position that suggests some segmental instability at lumbar 3-4 lumbar 4-5. This was confirmed with radiology.  His symptoms are primarily claudicating in nature and likely due to his central stenosis at lumbar 3-4 lumbar 5-sacral 1. DEXA showed osteopenia. Given multiple levels of dynamic instabilities in setting of central stenosis with foraminal narrowing, recommend lumbar 3-lumbar 4, lumbar 4-lumbar 5 and lumbar 5 sacral 1 anterior lumbar interbody fusion and posterior minimally invasive instrumented fusion with use  karl stealth.  Risks and benefits were discussed in detail including but not limited to infection, hematoma, nerve damage including paralysis, post op radiculitis, durotomy, lack of a sold bone fusion, hardware malfunction, risks associated with the use of general anesthesia, blood clots in the lungs or legs. All questions answered and he agreed to proceed.    PROCEDURE:  After obtaining informed consent, the patient was brought to the operating room with pneumatic stockings in place.  IV antibiotics was administered.  He was intubated under general endotracheal anesthesia. A smith catheter was placed and the pt was positioned supine on the operating table. He was shaved, prepped and draped in the usual sterile fashion.        The anterior approach was performed by Dr Ojeda of Vascular surgery.  Please see his dictation for opening, exposure, and closure of the anterior procedure.    Once the lumbar 3-lumbar 4, lumbar 4-lumbar 5 and lumbar 5-sacral 1 disc spaces were exposed we confirmed with x-ray the correct operating levels.  Next we performed the discectomy after perform the annulotomy using a #15 blade scalpel starting at the lumbar 5-sacral 1 level.  The discectomy was performed using a combination of curettes, Kerrison rongeurs, timbo.  Once the disc was free from the endplates we trailed our interbody implant.  An interbody graft which was pre packed with bone morphogenic protein and Ines was then placed.  Next we placed the integral screws two were placed inferiorly and one was place placed superiorly in the adjacent vertebral bodies. Of note his vasculature and tissues were very adherent to the left side of the disc spaces particularly at the lumbar 4-lumbar 5 and lumbar 5-sacral 1 levels which effected placement of the graft as we had more limited access to the far left disc spaces. After placing he lumbar 5-sacral 1 level, we moved to the lumbar 4-5 and then to the lumbar 3-4 completing the same  steps as above. We confirmed a nice tight fit.  We then proceeded to irrigate the incision and closure again was performed by  Dr Ojeda please see his dictation for further details.    We then repositioned the patient prone and performed the second stage of the procedure. We opened midline and placed the stealth localization guide along her spinous process. At this point, we brought in intraoperative O-arm for navigation. After confirming navigation of all instruments (registered separately on the navigational star), we proceeded to place the pedicle screws using stereotactic localization. The paramedian incisions were opened sharply and extended down to the fascia.  We then used the navigational wand to select an entry site, entered using the usual anatomical markers for the entry through the cortex of the pedicle. Once the entry site was confirmed, we then proceeded to use the navigational ahl through the pedicle and into the vertebral body. We then placed our bilateral pedicle screws at lumbar 3, lumbar 4, lumbar 5 and sacral 1..  Once this was in position, we then placed rods bilaterally and locked into place in the top-loaded heads of the pedicle screws and secured with set screws tightened to break off torque. We then proceeded to copiously irrigate the incision with antibiotic-containing saline and achieved hemostasis.       Due to the nature and complexity of the case an assistant was required for the duration of the case for  positioning, retraction, and closure for the second stage of the procedure.       The incision was closed in layers with 0 Vicryl for the fascia and muscle layer, inverted 2-0 Vicryl for subcutaneous tissues and monocryl for the skin edges. Dermabond was then placed. Sponge and needle counts were correct prior to closure x2. Sterile dressings were placed.      The patient was turned from the radiolucent frame onto a gurney, extubated and taken to the recovery room at their  neurological baseline with no new deficits appreciated.     Estimated blood loss: 1950 cc    Findings: Post op imaging showed adequate hardware place     Specimens: none    Drains: none    Implants:   Implant Name Type Inv. Item Serial No.  Lot No. LRB No. Used Action   GRAFT BONE INFUSE BMP LG [id number] - [id number]  GRAFT BONE INFUSE BMP LG [id number]  [employer] [id number] N/A 1 Implanted   KIT BNGF 6CC DMNR DARCY FBR ACCELERATE GRFTN CNN [id number] - [id number] Bone/Tissue/Biologic KIT BNGF 6CC DMNR DARCY FBR ACCELERATE GRFTN CNN [id number] [employer]  N/A 1 Implanted   KIT BNGF 6CC DMNR DARCY FBR ACCELERATE GRFTN CNN [id number] - [id number] Bone/Tissue/Biologic KIT BNGF 6CC DMNR DARCY FBR ACCELERATE GRFTN CNN [id number] [employer]  N/A 1 Implanted   KIT BNGF 6CC DMNR DARCY FBR ACCELERATE GRFTN CNN [id number] - [id number] Bone/Tissue/Biologic KIT BNGF 6CC DMNR DARCY FBR ACCELERATE GRFTN CNN [id number] [employer]  N/A 1 Implanted   CAGE SPNL [id number] ENDOSKELETON TAS 12D 21MM STD TI RADOPQ LG - [id number] Metallic Hardware/[place] CAGE SPNL [id number] ENDOSKELETON TAS 12D 21MM STD TI RADOPQ LG  [employer] [id number] N/A 1 Implanted   NONALOCK ENDOSKELETON TAS IMPLANT Metallic Hardware/[place]   TITAN SPINE [id number] N/A 1 Implanted   CAGE SPNL [id number] ENDOSKELETON TAS 7D 21MM STD [id number] - [id number] Metallic Hardware/[place] CAGE SPNL [id number] ENDOSKELETON TAS 7D 21MM STD [id number]  [employer] [id number]  1 Implanted   SCREW BN 25MM 5.5MM NS ENDOSKELETON TAS SPNE INTRBD FS - [id number] Metallic Hardware/[place] SCREW BN 25MM 5.5MM NS ENDOSKELETON TAS SPNE INTRBD FS  [employer]  N/A 9 Implanted   IMP SCR PEDICLE MEDT VOYAGER 6.5X45MM [id number] - [id number] Metallic Hardware/[place] IMP SCR PEDICLE MEDT VOYAGER 6.5X45MM [id number]  [employer] X N/A 4 Implanted   SCREW BN 40MM 6.5MM MA CNN XTD TAB NS SOLERA CD HZN SPNE - [id number] Metallic Hardware/[place] SCREW BN 40MM  6.5MM MA CNN XTD TAB NS SOLERA CD HZN SPNE  [employer] X N/A 1 Implanted   SCREW MULTIAXIAL VOYAGER 7.5X40MM - [id number] Metallic Hardware/[place] SCREW MULTIAXIAL VOYAGER 7.5X40MM  [employer] X N/A 1 Implanted   IMP SCR PEDICLE MEDT VOYAGER 6.5X50MM [id number] - [id number] Metallic Hardware/[place] IMP SCR PEDICLE MEDT VOYAGER 6.5X50MM [id number]  [employer] X N/A 2 Implanted   Medtronic 90mm Capped Romel Metallic Hardware/[place]   [employer] X N/A 2 Implanted   IMP SCR MEDT VOYAGER 4.75MM [id number] - [id number] Metallic Hardware/[place] IMP SCR MEDT VOYAGER 4.75MM [id number]  [employer] X N/A 8 Implanted       Yaneth Meyer MD      CC: No Ref-Primary, Physician

## 2024-01-03 VITALS
OXYGEN SATURATION: 97 % | SYSTOLIC BLOOD PRESSURE: 139 MMHG | BODY MASS INDEX: 27.77 KG/M2 | RESPIRATION RATE: 18 BRPM | TEMPERATURE: 98.9 F | DIASTOLIC BLOOD PRESSURE: 80 MMHG | WEIGHT: 205 LBS | HEIGHT: 72 IN | HEART RATE: 78 BPM

## 2024-01-03 PROBLEM — N39.0 URINARY TRACT INFECTION ASSOCIATED WITH INDWELLING URETHRAL CATHETER (H): Status: ACTIVE | Noted: 2024-01-03

## 2024-01-03 PROBLEM — T83.511A URINARY TRACT INFECTION ASSOCIATED WITH INDWELLING URETHRAL CATHETER (H): Status: ACTIVE | Noted: 2024-01-03

## 2024-01-03 LAB
ALBUMIN UR-MCNC: 20 MG/DL
APPEARANCE UR: CLEAR
ATRIAL RATE - MUSE: 83 BPM
BILIRUB UR QL STRIP: NEGATIVE
COLOR UR AUTO: YELLOW
DIASTOLIC BLOOD PRESSURE - MUSE: NORMAL MMHG
GLUCOSE UR STRIP-MCNC: NEGATIVE MG/DL
HGB UR QL STRIP: NEGATIVE
INTERPRETATION ECG - MUSE: NORMAL
KETONES UR STRIP-MCNC: NEGATIVE MG/DL
LEUKOCYTE ESTERASE UR QL STRIP: NEGATIVE
MUCOUS THREADS #/AREA URNS LPF: PRESENT /LPF
NITRATE UR QL: NEGATIVE
P AXIS - MUSE: 25 DEGREES
PH UR STRIP: 6.5 [PH] (ref 5–7)
PR INTERVAL - MUSE: 140 MS
QRS DURATION - MUSE: 86 MS
QT - MUSE: 392 MS
QTC - MUSE: 460 MS
R AXIS - MUSE: -15 DEGREES
RBC URINE: 8 /HPF
SP GR UR STRIP: 1.03 (ref 1–1.03)
SQUAMOUS EPITHELIAL: <1 /HPF
SYSTOLIC BLOOD PRESSURE - MUSE: NORMAL MMHG
T AXIS - MUSE: 41 DEGREES
UROBILINOGEN UR STRIP-MCNC: 12 MG/DL
VENTRICULAR RATE- MUSE: 83 BPM
WBC URINE: 3 /HPF

## 2024-01-03 PROCEDURE — 99232 SBSQ HOSP IP/OBS MODERATE 35: CPT | Performed by: HOSPITALIST

## 2024-01-03 PROCEDURE — 250N000013 HC RX MED GY IP 250 OP 250 PS 637: Performed by: HOSPITALIST

## 2024-01-03 PROCEDURE — 250N000013 HC RX MED GY IP 250 OP 250 PS 637: Performed by: PHYSICIAN ASSISTANT

## 2024-01-03 PROCEDURE — 81001 URINALYSIS AUTO W/SCOPE: CPT | Performed by: HOSPITALIST

## 2024-01-03 PROCEDURE — 93010 ELECTROCARDIOGRAM REPORT: CPT | Performed by: INTERNAL MEDICINE

## 2024-01-03 PROCEDURE — 93005 ELECTROCARDIOGRAM TRACING: CPT

## 2024-01-03 PROCEDURE — 250N000013 HC RX MED GY IP 250 OP 250 PS 637

## 2024-01-03 RX ORDER — METHOCARBAMOL 750 MG/1
750 TABLET, FILM COATED ORAL 4 TIMES DAILY
Qty: 42 TABLET | Refills: 0 | Status: SHIPPED | OUTPATIENT
Start: 2024-01-03 | End: 2024-02-13

## 2024-01-03 RX ORDER — NITROFURANTOIN 25; 75 MG/1; MG/1
100 CAPSULE ORAL 2 TIMES DAILY
Qty: 13 CAPSULE | Refills: 0 | Status: SHIPPED | OUTPATIENT
Start: 2024-01-03 | End: 2024-01-10

## 2024-01-03 RX ORDER — NITROFURANTOIN 25; 75 MG/1; MG/1
100 CAPSULE ORAL 2 TIMES DAILY
Status: DISCONTINUED | OUTPATIENT
Start: 2024-01-03 | End: 2024-01-03 | Stop reason: HOSPADM

## 2024-01-03 RX ORDER — GABAPENTIN 300 MG/1
CAPSULE ORAL
Qty: 96 CAPSULE | Refills: 0 | Status: SHIPPED | OUTPATIENT
Start: 2024-01-03 | End: 2024-02-05

## 2024-01-03 RX ORDER — OXYCODONE HYDROCHLORIDE 5 MG/1
5 TABLET ORAL EVERY 4 HOURS PRN
Qty: 42 TABLET | Refills: 0 | Status: SHIPPED | OUTPATIENT
Start: 2024-01-03 | End: 2024-02-13

## 2024-01-03 RX ADMIN — ASPIRIN 81 MG CHEWABLE TABLET 81 MG: 81 TABLET CHEWABLE at 08:34

## 2024-01-03 RX ADMIN — PANTOPRAZOLE SODIUM 40 MG: 40 TABLET, DELAYED RELEASE ORAL at 08:34

## 2024-01-03 RX ADMIN — ACETAMINOPHEN 650 MG: 325 TABLET ORAL at 01:41

## 2024-01-03 RX ADMIN — APIXABAN 5 MG: 5 TABLET, FILM COATED ORAL at 08:34

## 2024-01-03 RX ADMIN — METHOCARBAMOL TABLETS 750 MG: 750 TABLET, COATED ORAL at 08:34

## 2024-01-03 RX ADMIN — NITROFURANTOIN (MONOHYDRATE/MACROCRYSTALS) 100 MG: 75; 25 CAPSULE ORAL at 12:53

## 2024-01-03 RX ADMIN — THERA TABS 1 TABLET: TAB at 08:34

## 2024-01-03 RX ADMIN — SENNOSIDES AND DOCUSATE SODIUM 1 TABLET: 8.6; 5 TABLET ORAL at 08:34

## 2024-01-03 ASSESSMENT — ACTIVITIES OF DAILY LIVING (ADL)
ADLS_ACUITY_SCORE: 27

## 2024-01-03 NOTE — PROGRESS NOTES
[facility] 1/3/2023    Neurosurgery Daily Note    Assessment & Plan   Procedure(s):  lumbar 3-lumbar 4, lumbar 4-lumbar 5 and lumbar 5 sacral 1 anterior lumbar interbody fusion with use of bone morphogenic protein and lumbar 3-sacral 1 posterior minimally invasive instrumented fusion with use of stealth  SURGICAL EXPOSURE, ANTERIOR APPROACH, WITH WOUND CLOSURE, FOR LUMBAR SPINE SURGERY, BY GENERAL SURGERY   5 Days Post-Op    Patient states that he is doing well has slight complaint of incisional pain but denies radicular lower extremity pain numbness tingling or weakness. Ambulating well in asencio. No urinary complaints and voiding without difficulty.      Discussed finding of CT abdomen with hospitalist and recommendations for further outpatient workup in in regards to caudate lobe lesion      Plan:  -okay to discharge home today  - will follow up as scheduled   -Lovenox will be STOPPED as eliquis will resumed yesterday ASA started 48 hours post op. Eliquis may resume 72 hours post op as patient has history of PE and MI. Orders in.     Dr. Meyer in agreement     Principal Problem:    S/P lumbar fusion     Interval History   States that he is doing well has slight complaint of posterior incisional tightness and spasms this morning has he has ambulated 3 laps in the asencio but denies radicular lower extremity pain numbness tingling or weakness    Physical Exam   /80 (BP Location: Right arm)   Pulse 78   Temp 98.9  F (37.2  C) (Oral)   Resp 18   Ht 1.829 m (6')   Wt 93 kg (205 lb)   SpO2 97%   BMI 27.80 kg/m          Awake, alert, appropriate   Dressings are stable anterior and posterior  5/5 BLE, feels RLE getting stronger   Sensation intact BLE       Images reviewed personally     IMPRESSION:   1.  Surgical hardware from L3 to S1.  2.  Retroperitoneal air, stranding, and fluid are related to recent intervention and may be within normal limits. Cannot assess for active bleeding on  this study.   3.  Wall thickening of the urinary bladder may be from recent intervention. Consider infectious cystitis.  4.  A 16 mm x 13 mm caudate lobe lesion is likely benign. This could be characterized with MRI.  5.  Mild cardiomegaly.    IMPRESSION: 5 lumbar type vertebrae. Interval postsurgical changes of posterior instrumented fusion from L3 to S1. Also interbody fusion from L3-L4 to L5-S1. The surgical hardware appear intact without evidence for fracture or loosening. Normal sagittal   alignment. Stable mild superior endplate compression fracture of L2. Moderate degenerative changes of the sacroiliac joints and the hip joints.      Sarah Mercado PA-C  [facility]  O: [phone]

## 2024-01-03 NOTE — PROGRESS NOTES
Patient ready for discharge. Discharge instructions discussed and questions answered. All belongings are with the patient.   Patient's ride is unable to pick him up until 1530/1600.

## 2024-01-03 NOTE — DISCHARGE SUMMARY
HOSPITAL DISCHARGE SUMMARY         Patient Name: Raoul Henry  YOB: 1955  Age: 68 year old  Medical Record Number: [id number]  Primary Physician: No Ref-Primary, Physician  Admission Date: 12/29/2023.  Discharge Date:1/3/2023      He will be discharged from [facility] to home    PRINCIPAL DIAGNOSIS CAUSING ADMISSION: S/P lumbar fusion    Discharge Diagnoses:  Principal Problem:    S/P lumbar fusion  Active Problems:    Urinary tract infection associated with indwelling urethral catheter       HPI:Raoul Henry is a 69 yo male who presents with low back and bilateral leg pain. MRI of lumbar spine demonstrates moderate stenosis at lumbar 3-4 as well as lumbar 5-sacral 1. He also has moderate foraminal narrowing at lumbar 5-sacral 1 and lumbar 3-4. At lumbar 4-5 he has mild to moderate central stenosis with moderate left foraminal narrowing. In supine position he has 5 mm of anterolisthesis of lumbar 5-sacral 1. In the upright position on x-ray, patient develops anterolisthesis of both lumbar 3-4 and lumbar 4-5.  He continues to have grade 1 anterolisthesis at lumbar 5-sacral 1.  Although there does not appear to be dynamic instability between flexion-extension while upright since he develops anterolisthesis between the supine and upright position that suggests some segmental instability at lumbar 3-4 lumbar 4-5. This was confirmed with radiology.  His symptoms are primarily claudicating in nature and likely due to his central stenosis at lumbar 3-4 lumbar 5-sacral 1. DEXA showed osteopenia. Given multiple levels of dynamic instabilities in setting of central stenosis with foraminal narrowing, recommend lumbar 3-lumbar 4, lumbar 4-lumbar 5 and lumbar 5 sacral 1 anterior lumbar interbody fusion and posterior minimally invasive instrumented fusion with use of stealth.  Risks and benefits were discussed in detail including but not limited to infection, hematoma, nerve damage including paralysis,  post op radiculitis, durotomy, lack of a sold bone fusion, hardware malfunction, risks associated with the use of general anesthesia, blood clots in the lungs or legs. All questions answered and he agreed to proceed.     BRIEF HOSPITAL COURSE: Raoul Henry is a 68 year old male who was admitted on day of surgery and underwent Procedure(s):  lumbar 3-lumbar 4, lumbar 4-lumbar 5 and lumbar 5 sacral 1 anterior lumbar interbody fusion with use of bone morphogenic protein and lumbar 3-sacral 1 posterior minimally invasive instrumented fusion with use of stealth  SURGICAL EXPOSURE, ANTERIOR APPROACH, WITH WOUND CLOSURE, FOR LUMBAR SPINE SURGERY, BY GENERAL SURGERY.  Surgery was without complications.  Postoperatively he reported incisional pain but denies any radicular lower extremity pain numbness tingling or weakness.   On exam on day of discharge, his strength was 5/5 with no new focal deficits.  PT/OT consultations were obtained to assess function, assist with mobilization, and evaluate for discharge recommendations.  By POD # 0 he was tolerating a regular diet, ambulating, voiding without difficulty, and obtaining good pain control on oral medication.  His incision was clean, dry and intact.   He met all discharge criteria and was stable for discharge.      PROCEDURES PERFORMED DURING HOSPITALIZATION: Procedure(s):  lumbar 3-lumbar 4, lumbar 4-lumbar 5 and lumbar 5 sacral 1 anterior lumbar interbody fusion with use of bone morphogenic protein and lumbar 3-sacral 1 posterior minimally invasive instrumented fusion with use of stealth  SURGICAL EXPOSURE, ANTERIOR APPROACH, WITH WOUND CLOSURE, FOR LUMBAR SPINE SURGERY, BY GENERAL SURGERY     COMPLICATIONS IN HOSPITAL:  None.    IMPORTANT PENDING TEST RESULTS: None.    PERTINENT FINDINGS/RESULTS AT DISCHARGE:  None.    CONDITION AT DISCHARGE:  improving    DISCHARGE MEDICATIONS  Robaxin   Gabapentin   Oxycodone      AFTER DISCHARGE ORDERS AND INSTRUCTIONS:    Follow up  with Neurosurgery: in 2  weeks  Follow up appointment with Primary Care Physician: No Ref-Primary, Physician as needed  Diet: regular diet  Activity: *No lifting, pushing or pulling greater than 5-10 pounds (this is about a gallon of milk).  *No repetitive bending, twisting, or jarring activities  *No overhead work  *No aerobic or strenuous activity  *No activities with increased risk of falls  *You may move about your home as tolerated  *You may walk up and down stairs as tolerated  *You may increase your activity slowly over the next 4-6 weeks    WALKING PROGRAM: As you can tolerate, walk daily-start with 5-10 minutes of continuous walking. This is in addition to the walking that you do as part of your daily activities. Increase the time that you walk by 5 minutes every couple of days. Do not exceed 30-45 minutes of continuous walking until seen in follow-up. Walking is the best exercise after surgery.  **Listen to your body, if you find that you are more painful or fatigued, you may need to proceed more slowly.    **Do not smoke or expose yourself to second hand smoke. Cigarette smoke can delay healing and cause complications.     DRIVING:  We recommend that you do not drive while taking medications for pain or muscle spasms. Always read and follow the advice on your prescription bottle. If you have questions, speak with your pharmacist.  We recommend that you do not drive while wearing a brace, as it could limit your range of motion.    WORK: If you plan to return to work before your 4-6 week appointment, call and discuss with one of the nurses in the neurosurgery office.     USE OF ICE OR  HEAT:  *Icing can decrease post op swelling, thereby helping with post op pain control. Always protect your skin to prevent frostbite or burn.    *For the first 48 hours we recommend ice to the surgical area for 15-20 minutes at a time several times a day.      *Any longer than 15-20 minutes can cause damage to the tissues,  including frostbite.     *Allow area to warm for at least 45 minutes or an hour between treatments.    *Ice as frequently as you wish, so long as the area is warm to touch and has normal sensation before repeating.    *After 48 hours, you may use heat or ice, which ever feels best to you.  Always remember to protect your skin, and to use no greater than 15-20 minutes at a time.     *You can make your own ice bags at home by mixing 3 parts water with 1 part rubbing alcohol in a Ziplock bag and placing in the freezer.  It will not freeze solid.      BOWEL MOVEMENT:  If you did not have a bowel movement (pooped) before you were discharged from the hospital, and do not have a bowel movement within 2 days of discharge. Please call our office and request to speak to a nurse.    Your insurance may not cover medications to treat or prevent  constipation.      There are many  over the counter medications for constipation including: Colace, Senakot, Dulcolax, and Miralax. In addition to medications eat a high fiber diet and drink plenty of water.    If you are taking narcotics, you should being taking medication daily for constipation.      If you develop loose or runny stools, stop taking the medications for constipation.   Warnings: Call [phone] with the following symptoms:    *Temperature 101(fever) or greater or chills  *Redness, swelling or increased drainage from the wound  *Worsening pain not relieved by the pain prescription given  *Worsening or new onset of weakness, or numbness and tingling  *Loss or change in your ability to control bowel or bladder function  *Change in your ability to walk, talk, see or think  *If you did not have a bowel movement before leaving the hospital and your bowels have not moved within 48 hours of your discharge    !!!! IF YOU HAVE A SERIOUS OR THREATENING EMERGENCY, CALL 911 OR COME TO THE EMERGENCY ROOM.  IF YOUR CONDITIONS ALLOWS COME TO THE HOSPITAL WHERE YOUR SURGERY WAS  PERFORMED.    QUESTIONS OR CONCERNS:   OUR OFFICE HOURS ARE FROM 9:00 A.M -4:30 P.M. MONDAY-FRIDAY.  AFTER OFFICE HOURS, CALLS ARE ANSWERED BY THE ON-CALL PROVIDER. PLEASE CALL WITH ROUTINE QUESTIONS DURING OFFICE HOURS. THE ON-CALL PROVIDER WILL NOT REFILL PRESCRIPTIONS.   Wound care:WOUND CARE:  A dressing is not required.      Wash your hands before touching the wound.  Ensure that anyone assisting you in the care of your wound washes her/his hands before touching the wound. Good handwashing can decrease the risk of serious infection.    If you are unable to see your wound, have someone check the wound daily for redness, swelling,or drainage.   You may shower. Wash your wound daily.  Apply mild soap directly to the wound, form a light lather and rinse with running water. Pat the wound dry.  Do not rub, pick, or otherwise help the dermabond come off more quickly.  Do not place tape or adhesive over the dermabond.    Do not submerge the wound under water. No baths or swimming for 6 weeks.     If you develop redness, swelling, drainage, or temp 101 or greater, call our office at [phone]    You may have an appointment in 7-10 days to have your wound checked.       Sarah Mercado PA-C  [facility]  O: [phone]

## 2024-01-03 NOTE — PLAN OF CARE
Patient vital signs are at baseline: Yes  Patient able to ambulate as they were prior to admission or with assist devices provided by therapies during their stay:  Yes  Patient MUST void prior to discharge:  Yes  Patient able to tolerate oral intake:  Yes  Pain has adequate pain control using Oral analgesics:  Yes  Does patient have an identified :  Yes  Has goal D/C date and time been discussed with patient:  Yes    Pt is A&Ox4, VSS on RA. Independent in room. Voiding adequately. Dressing to front and back is CDI. CMS intact. Pt is reporting moderate pain during shift. Utilized scheduled and PRN medications along with non-pharm therapies for pain relief.

## 2024-01-03 NOTE — PLAN OF CARE
Problem: Spinal Surgery  Goal: Optimal Coping with Surgery  Outcome: Progressing  Goal: Absence of Bleeding  Outcome: Progressing  Goal: Effective Bowel Elimination  Outcome: Progressing  Goal: Fluid and Electrolyte Balance  Outcome: Progressing  Goal: Optimal Functional Ability  Outcome: Progressing  Goal: Absence of Infection Signs and Symptoms  Outcome: Progressing  Goal: Optimal Neurologic Function  Outcome: Progressing  Goal: Anesthesia/Sedation Recovery  Outcome: Progressing  Intervention: Optimize Anesthesia Recovery  Recent Flowsheet Documentation  Taken 1/3/2024 0845 by Micki Brar RN  Safety Promotion/Fall Prevention:   activity supervised   assistive device/personal items within reach   clutter free environment maintained   increased rounding and observation   increase visualization of patient   lighting adjusted   mobility aid in reach   nonskid shoes/slippers when out of bed   patient and family education   room door open   room organization consistent   room near nurse's station   safety round/check completed  Goal: Optimal Pain Control and Function  Outcome: Progressing  Intervention: Prevent or Manage Pain  Recent Flowsheet Documentation  Taken 1/3/2024 0837 by Micki Brar RN  Pain Management Interventions:   medication (see MAR)   breathing exercises   care clustered   cold applied   pain management plan reviewed with patient/caregiver   pillow support provided   quiet environment facilitated   rest   repositioned  Goal: Nausea and Vomiting Relief  Outcome: Progressing  Goal: Effective Urinary Elimination  Outcome: Progressing  Goal: Effective Oxygenation and Ventilation  Outcome: Progressing       Patient vital signs are at baseline: Yes  Patient able to ambulate as they were prior to admission or with assist devices provided by therapies during their stay:  Yes  Patient MUST void prior to discharge:  Yes  Patient able to tolerate oral intake:  Yes  Pain has adequate pain control using  Oral analgesics:  Yes  Does patient have an identified :  Yes  Has goal D/C date and time been discussed with patient:  Yes

## 2024-01-03 NOTE — PROGRESS NOTES
[facility]    Medicine Progress Note - Hospitalist Service    Date of Admission:  12/29/2023    Assessment & Plan   Raoul Henry is a 68 year old male admitted s/p lumbar fusion.  He is clinically stable.  He has radiographic findings and symptoms consistent with cystitis.  Urethral catheter removed the evening of 12/31, so this is considered a catheter associated infection.  He has no fevers.  Plan to complete course of nitrofurantoin.    # s/p lumbar fusion  - per orthopaedics    # Irregular rhythm  - EKG    # Uncomplicated cystitis  - UA w/reflex culture  - nitrofurantoin     # Constipation, resolved     # CAD, prior MI  - on ASA  - statin, metoprolol     # Hx of PE  - on apixaban    # Outpatient followup  - liver lesion    Addendum:  EKG (my read) - sinus rhythm with PVCs, ?TW flattening in III; no acute ischemic changes    No objections to discharge from a medicine perspective.  Patient will need followup on UA w/cx to determine need to adjust abx.  Course of nitrofurantoin prescribed.          Diet: Advance Diet as Tolerated: Regular Diet Adult  Diet      Hu Catheter: Not present  Lines: None     Cardiac Monitoring: None  Code Status: Full Code      Clinically Significant Risk Factors                  # Hypertension: Noted on problem list        # Overweight: Estimated body mass index is 27.8 kg/m  as calculated from the following:    Height as of this encounter: 1.829 m (6').    Weight as of this encounter: 93 kg (205 lb).      # Asthma: noted on problem list        Disposition Plan      Expected Discharge Date: 01/03/2024,  4:00 PM      Discharge Comments: PT - Needs to do stairs            Vinnie Kirkpatrick MD  Hospitalist Service  [facility]  Securely message with [employer] (more info)  Text page via [employer] Paging/Directory   ______________________________________________________________________    Interval History   Reports mild pain with urination.   Reports abdominal pain at the incision.  Also reports abdominal irritation with the brace.  Denies chest pain/pressure or dyspnea.    Reports history of irregular heart rhythm.  He is aware of the liver lesion noted on the CT.    Physical Exam   Vital Signs: Temp: 98.9  F (37.2  C) Temp src: Oral BP: 139/80 Pulse: 78   Resp: 18 SpO2: 97 % O2 Device: None (Room air)    Weight: 205 lbs 0 oz    Gen:  lying comfortably in bed, nad  Neuro: alert, conversant  CV:  nl rate, irregular rhythm  Pulm: no acute resp distress, ctab anteriorly  GI:  abdomen soft, no tenderness to palpation in the lateral aspects of the abdomen    Medical Decision Making             Data

## 2024-01-05 ENCOUNTER — TELEPHONE (OUTPATIENT)
Dept: NEUROSURGERY | Facility: CLINIC | Age: 69
End: 2024-01-05
Payer: COMMERCIAL

## 2024-01-05 ENCOUNTER — TELEPHONE (OUTPATIENT)
Dept: FAMILY MEDICINE | Facility: CLINIC | Age: 69
End: 2024-01-05
Payer: COMMERCIAL

## 2024-01-05 NOTE — TELEPHONE ENCOUNTER
Patient would like a call back.  He is experiencing  fluid build up in his groin and penis.  Asking if this is normal. He has no pain though  Thank you

## 2024-01-05 NOTE — TELEPHONE ENCOUNTER
"Raoul calls back, states he is pain-free and is doing \"great\". Took just few oxycodone 5 mg, on Robaxin and Tylenol prn. Feels his back brace causes a lot of pressure and swelling in his genitals. Denies red flag symptoms.   Discussed with Raoul restrictions and limitations, reminded about ice/heat, positioning. Asked Raoul to keep us updated.   Follow up on 01/11/2024.  Sandra Thorpe, RN, CNRN     "

## 2024-01-05 NOTE — TELEPHONE ENCOUNTER
Called patient's PCP office (Dr. Bowling) and left message with staff for their team to repeat a UA at next followup given protein in urine.    I called the patient and communicated this recommendation as well.  He reports having swelling in his scrotum and penis.  No associated pain.  He has some swelling in his abdomen.  He is wondering if there is a relationship with the pressure being applied on the brace.  Swelling improves when the brace is not on.  He is ambulatory.  He called in earlier today with complaint of swelling.  I recommend following up with his surgery team (as he already has a call in earlier today).  He can Dr. Bowling's office for followup as well.

## 2024-01-11 ENCOUNTER — ALLIED HEALTH/NURSE VISIT (OUTPATIENT)
Dept: NEUROSURGERY | Facility: CLINIC | Age: 69
End: 2024-01-11
Payer: COMMERCIAL

## 2024-01-11 VITALS — DIASTOLIC BLOOD PRESSURE: 68 MMHG | HEART RATE: 72 BPM | SYSTOLIC BLOOD PRESSURE: 115 MMHG | RESPIRATION RATE: 18 BRPM

## 2024-01-11 DIAGNOSIS — M53.2X6 LUMBAR SPINE INSTABILITY: Primary | ICD-10-CM

## 2024-01-11 PROCEDURE — 99207 PR NO CHARGE NURSE ONLY: CPT

## 2024-01-11 NOTE — PROGRESS NOTES
"Raoul Henry is status post lumbar 3-lumbar 4, lumbar 4-lumbar 5 and lumbar 5 sacral 1 anterior interbody lumbar fusion with use of allograft;  Lumbar 3-sacral 1 posterior minimally invasive instrumented fusion on 12/29/2023 with Dr. Meyer.  Preoperatively presented with low back and bilateral leg pain.     Today he returns in follow up for wound check. He is very pleased with his outcome - reports a minimal back pain and \"muscle\" tightness along the interior incision. Denies pain in legs except some chronic pain in right hip - [facility]. Notes much improved strength in legs, uses a walker for safety. Denies numbness or tingling. Gait and balance are normal. Takes Tylenol and Robaxin prn. Wears a back brace.    Discussed with Dr. Meyer who saw Raoul during his appt in clinic today.     Surgical wounds WNL - CDI, no signs of infection or skin breakdown.  Incisions well-healed: good skin approximation, no redness or visible/palpable edema, no tenderness to palpation.  PT. AF, denies fever, chills or sweats.  Pt. reports that the symptoms are improved from pre-op.    Sandra Thorpe, RN, CNRN      "

## 2024-01-22 ENCOUNTER — TELEPHONE (OUTPATIENT)
Dept: NEUROSURGERY | Facility: CLINIC | Age: 69
End: 2024-01-22
Payer: COMMERCIAL

## 2024-01-22 NOTE — TELEPHONE ENCOUNTER
Returned call to discuss symptoms. Reports some swelling in his feet and legs. Patient stated this has happened before intermittently. Advised to elevate legs and try compression wraps, and if swelling doesn't resolve to follow-up with primary care to investigate cause. Patient requested to be contacted by other RN on team.

## 2024-01-22 NOTE — TELEPHONE ENCOUNTER
[facility] Call Center    Phone Message    May a detailed message be left on voicemail: yes     Reason for Call: Symptoms or Concerns     If patient has red-flag symptoms, warm transfer to triage line    Current symptom or concern: Pt states he is retaining some fluid and a little swollen in his legs and feet. Pt wondering if this is normal or if he should be concerned. Please call back to advise    Symptoms have been present for:  2 day(s)    Has patient previously been seen for this? No    By :     Date: 12/29/23    Are there any new or worsening symptoms? Yes: New izabella    Action Taken: Message routed to:  Other: [facility]    Travel Screening: Not Applicable

## 2024-01-22 NOTE — TELEPHONE ENCOUNTER
Called Raoul who reported no concerning symptoms except intermittent mild edema in legs associated with prolong walking. Discussed conservative measures including leg elevation, compression stockings and fluid consumption. Advised to defer to his PCP should edema persist or worsens. He verbalized satisfaction.  Sandra Thorpe RN, CNRN

## 2024-02-06 ENCOUNTER — DOCUMENTATION ONLY (OUTPATIENT)
Dept: NEUROSURGERY | Facility: CLINIC | Age: 69
End: 2024-02-06
Payer: COMMERCIAL

## 2024-02-06 DIAGNOSIS — R26.89 IMPAIRED GAIT AND MOBILITY: Primary | ICD-10-CM

## 2024-02-08 ENCOUNTER — HOSPITAL ENCOUNTER (OUTPATIENT)
Dept: RADIOLOGY | Facility: HOSPITAL | Age: 69
Discharge: HOME | End: 2024-02-08
Attending: SURGERY | Admitting: SURGERY
Payer: COMMERCIAL

## 2024-02-08 ENCOUNTER — TELEPHONE (OUTPATIENT)
Dept: NEUROSURGERY | Facility: CLINIC | Age: 69
End: 2024-02-08

## 2024-02-08 DIAGNOSIS — M53.2X6 LUMBAR SPINE INSTABILITY: ICD-10-CM

## 2024-02-08 PROCEDURE — 72100 X-RAY EXAM L-S SPINE 2/3 VWS: CPT

## 2024-02-08 NOTE — TELEPHONE ENCOUNTER
Raoul had an appointment today but was running late so needed to move his xray to a later time, Jayna does not have any openings later today so he is scheduled for 02/13/2024.  Raoul wants to hear back about his images before than.  I told him I would let the team know but I can not promise anything.

## 2024-02-09 ENCOUNTER — TELEPHONE (OUTPATIENT)
Dept: NEUROSURGERY | Facility: CLINIC | Age: 69
End: 2024-02-09
Payer: COMMERCIAL

## 2024-02-09 NOTE — TELEPHONE ENCOUNTER
[facility] Call Center    Phone Message    May a detailed message be left on voicemail: yes     Reason for Call: Other: Raoul called he said his appointment with Jayna Brenner on Monday is suppose to be video but it is scheduled for in person. Please see if that can be changed and call patient to discuss    Action Taken: Other: [facility]    Travel Screening: Not Applicable

## 2024-02-13 ENCOUNTER — VIRTUAL VISIT (OUTPATIENT)
Dept: NEUROSURGERY | Facility: CLINIC | Age: 69
End: 2024-02-13
Payer: COMMERCIAL

## 2024-02-13 DIAGNOSIS — Z98.1 S/P LUMBAR AND LUMBOSACRAL FUSION BY ANTERIOR TECHNIQUE: Primary | ICD-10-CM

## 2024-02-13 DIAGNOSIS — M53.2X6 LUMBAR SPINE INSTABILITY: ICD-10-CM

## 2024-02-13 DIAGNOSIS — Z98.1 S/P LUMBAR FUSION: ICD-10-CM

## 2024-02-13 DIAGNOSIS — M43.16 SPONDYLOLISTHESIS OF LUMBAR REGION: ICD-10-CM

## 2024-02-13 PROCEDURE — 99024 POSTOP FOLLOW-UP VISIT: CPT | Mod: 93 | Performed by: NURSE PRACTITIONER

## 2024-02-13 RX ORDER — METHOCARBAMOL 750 MG/1
750 TABLET, FILM COATED ORAL AT BEDTIME
Qty: 30 TABLET | Refills: 0 | Status: SHIPPED | OUTPATIENT
Start: 2024-02-13 | End: 2024-03-05

## 2024-02-13 NOTE — LETTER
2/13/2024         RE: Raoul Henry  [address]   [address]  [facility] [address]        Dear Colleague,    Thank you for referring your patient, Raoul Henry, to the [facility]UR[facility]ERY. Please see a copy of my visit note below.    CHIEF COMPLAINT / REASON FOR VISIT  post operative virtual visit     ASSESSMENT/PLAN:  (Z98.1) S/P lumbar and lumbosacral fusion by anterior technique  (primary encounter diagnosis)  (M53.2X6) Lumbar spine instability  (M43.16) Spondylolisthesis of lumbar region  Comment: doing quite well. Using minimal pain medications but is having spasms when laying down. All leg symptoms have improved. Not using walking aids any more.   Plan: PT order, take muscle relaxer at night only, follow up in 6 weeks for his 3 month post op with Dr Meyer. Xrays prior.     HISTORY OF PRESENT ILLNESS  Raoul Henry is a 68 year old male who underwent 6 week post op from L 3-L4, L4-L5 and L5 S1 AP fusion with use of bone morphogenic protein on 12/29/23 by Dr Meyer. This is his 6 week follow up.      PAIN: spasm once in a while when laying down, rating 4/10 and lasting only a few seconds. They are not occurring when walking or moving. Only taking 1/2 tab, MR and oxy only once in while. In the last week, taken 1/2 oxy 2 times and 1/2 muscle relaxer. Leg pains are gone. Hasn't felt pain pain.     INCISION: no issues  at all . Numb around incision.    ACTIVITY: Is not longer using walker or cane. Walking around 3 floors a day around apartment and also has walked 6 blocks outside. Riding stationary bike. PT not starting. Wearing brace as instructed    NEW SYMPTOMS: none    Current Outpatient Medications   Medication     acetaminophen (TYLENOL) 325 MG tablet     albuterol (PROAIR HFA/PROVENTIL HFA/VENTOLIN HFA) 108 (90 Base) MCG/ACT inhaler     apixaban ANTICOAGULANT (ELIQUIS ANTICOAGULANT) 5 MG tablet     aspirin (ASA) 81 MG chewable tablet     atorvastatin (LIPITOR) 80 MG tablet      clobetasol (TEMOVATE) 0.05 % external ointment     diphenhydrAMINE (BENADRYL) 25 MG tablet     DULoxetine (CYMBALTA) 20 MG capsule     fluticasone (FLOVENT HFA) 44 MCG/ACT inhaler     gabapentin (NEURONTIN) 300 MG capsule     methocarbamol (ROBAXIN) 750 MG tablet     metoprolol tartrate (LOPRESSOR) 25 MG tablet     oxyCODONE (ROXICODONE) 5 MG tablet     pantoprazole (PROTONIX) 40 MG EC tablet     traZODone (DESYREL) 100 MG tablet     triamcinolone (KENALOG) 0.1 % external cream     Vitamin D3 (CHOLECALCIFEROL) 25 mcg (1000 units) tablet     zolpidem (AMBIEN) 5 MG tablet     No current facility-administered medications for this visit.       PHYSICAL EXAMINATION     Deferred d/t virtual visit    DIAGNOSTICS  I independently reviewed imaging in the form of lumbar spine xrays obtained 2/8/2024. They demonstrate stable spinal alignment with hardware integrity.      Again, thank you for allowing me to participate in the care of your patient.        Sincerely,        Jayna Brenner, KWAKU CNP

## 2024-02-13 NOTE — PATIENT INSTRUCTIONS
Your X-rays are stable.    Liberalize lift restrictions by adding 5 lbs/week. If at any time you experience back pain or spasms, stop at that weight for 1 week and proceed with the same schedule.    We have ordered PT for you.    No NSAIDS until 6 months after surgery.    Return in 6 weeks for your 3 month post op appointment with the surgeon.

## 2024-02-13 NOTE — PROGRESS NOTES
CHIEF COMPLAINT / REASON FOR VISIT  post operative virtual visit     ASSESSMENT/PLAN:  (Z98.1) S/P lumbar and lumbosacral fusion by anterior technique  (primary encounter diagnosis)  (M53.2X6) Lumbar spine instability  (M43.16) Spondylolisthesis of lumbar region  Comment: doing quite well. Using minimal pain medications but is having spasms when laying down. All leg symptoms have improved. Not using walking aids any more.   Plan: PT order, take muscle relaxer at night only, follow up in 6 weeks for his 3 month post op with Dr Meyer. Xray's prior.     HISTORY OF PRESENT ILLNESS  Raoul Henry is a 68 year old male who underwent 6 week post op from L 3-L4, L4-L5 and L5 S1 AP fusion with use of bone morphogenic protein on 12/29/23 by Dr Meyer. This is his 6 week follow up.      PAIN: spasm once in a while when laying down, rating 4/10 and lasting only a few seconds. They are not occurring when walking or moving. Only taking 1/2 tab, MR and oxy only once in while. In the last week, taken 1/2 oxy 2 times and 1/2 muscle relaxer. Leg pains are gone. Hasn't felt pain.     INCISION: no issues  at all . Numb around incision.    ACTIVITY: Is not longer using walker or cane. Walking around 3 floors a day around apartment and also has walked 6 blocks outside. Riding stationary bike. PT not starting. Wearing brace as instructed    NEW SYMPTOMS: none    Current Outpatient Medications   Medication    acetaminophen (TYLENOL) 325 MG tablet    albuterol (PROAIR HFA/PROVENTIL HFA/VENTOLIN HFA) 108 (90 Base) MCG/ACT inhaler    apixaban ANTICOAGULANT (ELIQUIS ANTICOAGULANT) 5 MG tablet    aspirin (ASA) 81 MG chewable tablet    atorvastatin (LIPITOR) 80 MG tablet    clobetasol (TEMOVATE) 0.05 % external ointment    diphenhydrAMINE (BENADRYL) 25 MG tablet    DULoxetine (CYMBALTA) 20 MG capsule    fluticasone (FLOVENT HFA) 44 MCG/ACT inhaler    gabapentin (NEURONTIN) 300 MG capsule    methocarbamol (ROBAXIN) 750 MG tablet    metoprolol  tartrate (LOPRESSOR) 25 MG tablet    oxyCODONE (ROXICODONE) 5 MG tablet    pantoprazole (PROTONIX) 40 MG EC tablet    traZODone (DESYREL) 100 MG tablet    triamcinolone (KENALOG) 0.1 % external cream    Vitamin D3 (CHOLECALCIFEROL) 25 mcg (1000 units) tablet    zolpidem (AMBIEN) 5 MG tablet     No current facility-administered medications for this visit.       PHYSICAL EXAMINATION     Deferred d/t virtual visit    DIAGNOSTICS  I independently reviewed imaging in the form of lumbar spine xray's obtained 2/8/2024. They demonstrate stable spinal alignment with hardware integrity.

## 2024-02-13 NOTE — NURSING NOTE
"Raoul is a 68 year old who is being evaluated via a billable telephone visit.    6 wk s/p L fusion follow up  4/10 pain  \"Muscle spasm\"    What phone number would you like to be contacted at? [phone]  How would you like to obtain your AVS? Bee    Distant Location (provider location):  On-site  Phone call duration: 5 minutes  Patient is within the state of [place]  "

## 2024-03-01 ENCOUNTER — TELEPHONE (OUTPATIENT)
Dept: NEUROSURGERY | Facility: CLINIC | Age: 69
End: 2024-03-01
Payer: COMMERCIAL

## 2024-03-01 NOTE — TELEPHONE ENCOUNTER
Called patient to discuss follow-up schedule. Patient disagreed with follow-up schedule of XR within 3 days of next follow-up and the follow-up being in person. Writer attempted to explain rationale, patient would not allow writer to speak, informed patient call would be terminated if he did not communicate in an acceptable way, patient continued communicating in an unacceptable manor, call terminated.

## 2024-03-01 NOTE — TELEPHONE ENCOUNTER
[facility] Call Center    Phone Message    May a detailed message be left on voicemail: yes     Reason for Call: Other: Patient stated he would like his image orders to be sent over to [facility]. Please review.     Action Taken: Message routed to:  Other: [facility] Neurosurgery     Travel Screening: Not Applicable

## 2024-03-04 ENCOUNTER — MYC MEDICAL ADVICE (OUTPATIENT)
Dept: NEUROSURGERY | Facility: CLINIC | Age: 69
End: 2024-03-04
Payer: COMMERCIAL

## 2024-03-04 DIAGNOSIS — Z98.1 S/P LUMBAR AND LUMBOSACRAL FUSION BY ANTERIOR TECHNIQUE: Primary | ICD-10-CM

## 2024-03-05 ENCOUNTER — TELEPHONE (OUTPATIENT)
Dept: NEUROSURGERY | Facility: CLINIC | Age: 69
End: 2024-03-05
Payer: COMMERCIAL

## 2024-03-05 DIAGNOSIS — Z98.1 S/P LUMBAR FUSION: ICD-10-CM

## 2024-03-05 RX ORDER — METHOCARBAMOL 750 MG/1
750 TABLET, FILM COATED ORAL AT BEDTIME
Qty: 30 TABLET | Refills: 0 | Status: SHIPPED | OUTPATIENT
Start: 2024-03-05

## 2024-03-06 ENCOUNTER — MYC MEDICAL ADVICE (OUTPATIENT)
Dept: NEUROSURGERY | Facility: CLINIC | Age: 69
End: 2024-03-06
Payer: COMMERCIAL

## 2024-03-07 ENCOUNTER — TELEPHONE (OUTPATIENT)
Dept: NEUROSURGERY | Facility: CLINIC | Age: 69
End: 2024-03-07

## 2024-03-08 NOTE — TELEPHONE ENCOUNTER
Faxed  XR order  March 8, 2024 to fax number [phone] [facility]    Right Fax confirmed at 8:15 AM

## 2024-03-11 NOTE — TELEPHONE ENCOUNTER
Ideally he would see neurosurgery first since he is not even three months out from surgery, just to make sure there are no issues with his surgery.  Please assist with scheduling if needed.   If they are not able to see him I am happy to evaluate as well but we would still need to check with them to see if he would be a candidate for an injection this close to his surgery.  (This would need to be an in person visit).  It looks like he has also had a right hip injection last year through another clinic so he could potentially reach out to them as well.

## 2024-03-22 DIAGNOSIS — Z98.1 S/P LUMBAR FUSION: Primary | ICD-10-CM

## 2024-03-22 RX ORDER — METHOCARBAMOL 750 MG/1
750-1500 TABLET, FILM COATED ORAL 2 TIMES DAILY PRN
Qty: 60 TABLET | Refills: 1 | Status: SHIPPED | OUTPATIENT
Start: 2024-03-22 | End: 2024-04-24

## 2024-03-22 RX ORDER — METHOCARBAMOL 750 MG/1
750 TABLET, FILM COATED ORAL AT BEDTIME
Qty: 30 TABLET | Refills: 0 | Status: SHIPPED | OUTPATIENT
Start: 2024-03-22 | End: 2024-03-22

## 2024-03-22 NOTE — TELEPHONE ENCOUNTER
Robaxin refilled for 750-1500 mg twice a day as needed for muscle spasms. Patient called and agreed to this Rx.

## 2024-04-04 ENCOUNTER — HOSPITAL ENCOUNTER (OUTPATIENT)
Dept: GENERAL RADIOLOGY | Facility: HOSPITAL | Age: 69
Discharge: HOME | End: 2024-04-04
Attending: NURSE PRACTITIONER | Admitting: NURSE PRACTITIONER
Payer: COMMERCIAL

## 2024-04-04 ENCOUNTER — OFFICE VISIT (OUTPATIENT)
Dept: NEUROSURGERY | Facility: CLINIC | Age: 69
End: 2024-04-04
Attending: NURSE PRACTITIONER
Payer: COMMERCIAL

## 2024-04-04 VITALS
BODY MASS INDEX: 28.17 KG/M2 | OXYGEN SATURATION: 98 % | SYSTOLIC BLOOD PRESSURE: 152 MMHG | DIASTOLIC BLOOD PRESSURE: 77 MMHG | HEART RATE: 52 BPM | WEIGHT: 208 LBS | HEIGHT: 72 IN

## 2024-04-04 DIAGNOSIS — Z98.1 S/P LUMBAR AND LUMBOSACRAL FUSION BY ANTERIOR TECHNIQUE: ICD-10-CM

## 2024-04-04 DIAGNOSIS — Z98.1 S/P LUMBAR FUSION: Primary | ICD-10-CM

## 2024-04-04 PROCEDURE — 99213 OFFICE O/P EST LOW 20 MIN: CPT | Performed by: SURGERY

## 2024-04-04 PROCEDURE — 72100 X-RAY EXAM L-S SPINE 2/3 VWS: CPT

## 2024-04-04 ASSESSMENT — PAIN SCALES - GENERAL: PAINLEVEL: MILD PAIN (2)

## 2024-04-04 NOTE — PROGRESS NOTES
Raoul Henry is a 68 year old male who presents for:  Chief Complaint   Patient presents with    Surgical Followup     lumbar 3-lumbar 4, lumbar 4-lumbar 5 and lumbar 5 sacral 1 anterior lumbar interbody fusion  Tightness low back in the morning         Initial Vitals:  BP (!) 152/77   Pulse 52   Ht 6' (1.829 m)   Wt 208 lb (94.3 kg)   SpO2 98%   BMI 28.21 kg/m   Estimated body mass index is 28.21 kg/m  as calculated from the following:    Height as of this encounter: 6' (1.829 m).    Weight as of this encounter: 208 lb (94.3 kg).. Body surface area is 2.19 meters squared. BP completed using cuff size: regular  Mild Pain (2)    Nursing Comments:     Jazmín Toro MA

## 2024-04-04 NOTE — PROGRESS NOTES
NEUROSURGERY FOLLOW UP  NOTE    Raoul Henry comes today in follow-up. Patient is a 69 yo male who is status post L3-sacral 1 ALIF and posterior minimally invasive instrumented fusion on 12/29/23.  Overall doing well. No significant leg or back symptoms. He is doing well with home therapy exercises. Very active. Walking over 1 mile. Biking up to 45 minutes at home.started working with resistance bands. Up steep hills difficult still. Wears brace only when active. Not the aspen quick draw like an abdominal brace.     PHYSICAL EXAM:   Constitutional: BP (!) 152/77   Pulse 52   Ht 1.829 m (6')   Wt 94.3 kg (208 lb)   SpO2 98%   BMI 28.21 kg/m       Mental Status: A & O in no acute distress.  Affect is appropriate.  Speech is fluent.  Recent and remote memory are intact.  Attention span and concentration are normal.     Motor:  Normal bulk and tone all muscle groups of lower extremities. 5/5 in LE      Sensory: Sensation intact bilaterally to light touch in LE      Coordination:  non antalgic gait       Reflexes; knee/ ankle jerk hypo in both lower extremities      IMAGING:   I personally reviewed all radiographic images        CONSULTATION ASSESSMENT AND PLAN:    Reviewed xray's. Appear stable without any hardware failure. Repeat xray's in 3 months for 6 month post operative follow. He may move to the [facility] prior to his 1 year visit. We discussed getting a CT of his lumbar spine prior to his move to do a final evaluation.  Robaxin refilled as well.     Yaneth Meyer MD      CC:     No Ref-Primary, Physician  No address on file

## 2024-04-04 NOTE — LETTER
4/4/2024         RE: Raoul Henry  [address]   [address]  [facility] [address]        Dear Colleague,    Thank you for referring your patient, Raoul Henry, to the [facility]INE [facility] NEUROSURGERY. Please see a copy of my visit note below.    NEUROSURGERY FOLLOW UP  NOTE    Raoul Henry comes today in follow-up. Patient is a 69 yo male who is status post L3-sacral 1 ALIF and posterior minimally invasive instrumented fusion on 12/29/23.  Overall doing well. No significant leg or back symptoms. He is doing well with home therapy exercises. Very active. Walking over 1 mile. Biking up to 45 minutes at home.started working with resistance bands. Up steep hills difficult still. Wears brace only when active. Not the aspen quick draw like an abdominal brace.     PHYSICAL EXAM:   Constitutional: BP (!) 152/77   Pulse 52   Ht 1.829 m (6')   Wt 94.3 kg (208 lb)   SpO2 98%   BMI 28.21 kg/m       Mental Status: A & O in no acute distress.  Affect is appropriate.  Speech is fluent.  Recent and remote memory are intact.  Attention span and concentration are normal.     Motor:  Normal bulk and tone all muscle groups of lower extremities. 5/5 in LE      Sensory: Sensation intact bilaterally to light touch in LE      Coordination:  non antalgic gait       Reflexes; knee/ ankle jerk hypo in both lower extremities      IMAGING:   I personally reviewed all radiographic images        CONSULTATION ASSESSMENT AND PLAN:    Reviewed xray's. Appear stable without any hardware failure. Repeat xray's in 3 months for 6 month post operative follow. He may move to the [facility] prior to his 1 year visit. We discussed getting a CT of his lumbar spine prior to his move to do a final evaluation.  Robaxin refilled as well.     Yaneth Meyer MD      CC:     No Ref-Primary, Physician  No address on file    Raoul Henry is a 68 year old male who presents for:  Chief Complaint   Patient presents with     Surgical Followup      lumbar 3-lumbar 4, lumbar 4-lumbar 5 and lumbar 5 sacral 1 anterior lumbar interbody fusion  Tightness low back in the morning         Initial Vitals:  BP (!) 152/77   Pulse 52   Ht 6' (1.829 m)   Wt 208 lb (94.3 kg)   SpO2 98%   BMI 28.21 kg/m   Estimated body mass index is 28.21 kg/m  as calculated from the following:    Height as of this encounter: 6' (1.829 m).    Weight as of this encounter: 208 lb (94.3 kg).. Body surface area is 2.19 meters squared. BP completed using cuff size: regular  Mild Pain (2)    Nursing Comments:     Jazmín Toro MA      Again, thank you for allowing me to participate in the care of your patient.        Sincerely,        Yaneth Meyer MD

## 2024-04-24 ENCOUNTER — MYC REFILL (OUTPATIENT)
Dept: NEUROSURGERY | Facility: CLINIC | Age: 69
End: 2024-04-24
Payer: COMMERCIAL

## 2024-04-24 DIAGNOSIS — Z98.1 S/P LUMBAR AND LUMBOSACRAL FUSION BY ANTERIOR TECHNIQUE: ICD-10-CM

## 2024-04-24 DIAGNOSIS — Z98.1 S/P LUMBAR FUSION: ICD-10-CM

## 2024-04-24 RX ORDER — METHOCARBAMOL 750 MG/1
750 TABLET, FILM COATED ORAL AT BEDTIME
Qty: 30 TABLET | Refills: 0 | Status: CANCELLED | OUTPATIENT
Start: 2024-04-24

## 2024-04-25 RX ORDER — METHOCARBAMOL 750 MG/1
750-1500 TABLET, FILM COATED ORAL 2 TIMES DAILY PRN
Qty: 60 TABLET | Refills: 1 | Status: SHIPPED | OUTPATIENT
Start: 2024-04-25

## 2024-07-16 ENCOUNTER — HOSPITAL ENCOUNTER (OUTPATIENT)
Dept: GENERAL RADIOLOGY | Facility: HOSPITAL | Age: 69
Discharge: HOME | End: 2024-07-16
Attending: SURGERY | Admitting: SURGERY
Payer: COMMERCIAL

## 2024-07-16 DIAGNOSIS — Z98.1 S/P LUMBAR FUSION: ICD-10-CM

## 2024-07-16 PROCEDURE — 72100 X-RAY EXAM L-S SPINE 2/3 VWS: CPT

## 2024-07-17 ENCOUNTER — TELEPHONE (OUTPATIENT)
Dept: NEUROSURGERY | Facility: CLINIC | Age: 69
End: 2024-07-17
Payer: COMMERCIAL

## 2024-07-17 NOTE — TELEPHONE ENCOUNTER
Defer to our scheduling team - per Dr. Meyer  should  be a telephone vs in person appointment with MARTI.  Sandra Thorpe RN, CNRN

## 2024-07-17 NOTE — TELEPHONE ENCOUNTER
Requesting Results     Name/type of test: x-ray lumbar  Date of test: 07/16  Was test done at a location other than [facility] (Please fill in the location if not [facility])?: No    Could we send this information to you in [employer] or would you prefer to receive a phone call?:   Patient would prefer a phone call   Okay to leave a detailed message?: No at Cell number on file:    Telephone Information:   Mobile [phone]

## 2024-07-18 ENCOUNTER — VIRTUAL VISIT (OUTPATIENT)
Dept: NEUROSURGERY | Facility: CLINIC | Age: 69
End: 2024-07-18
Payer: COMMERCIAL

## 2024-07-18 DIAGNOSIS — Z98.1 S/P LUMBAR FUSION: Primary | ICD-10-CM

## 2024-07-18 PROCEDURE — 99441 PR PHYSICIAN TELEPHONE EVALUATION 5-10 MIN: CPT | Mod: 93

## 2024-07-18 NOTE — LETTER
7/18/2024      Raoul Henry  [address]   [address]  [place] [address]      Dear Colleague,    Thank you for referring your patient, Raoul Henry, to the [facility] SPINE AND NEUROSURGERY. Please see a copy of my visit note below.    Neurosurgery telephone office visit    HPI:   Raoul presents approximately 8mos s/p L3-sacral 1 ALIF and posterior minimally invasive instrumented fusion on 12/29/23 with Dr. Meyer. Continues to do well wo significant back or leg symptoms. He has continued with rehab exercises at home. He is up to 1.5mi walking. He has continued biking, stretching. He uses tylenol as needed for pain. He wears an abdominal brace when riding his Moped. Denies incisional concerns.     PMH/SH/ reviewed     Exam: deferred as this was a phone visit    Imaging  Lumbar xrays reviewed by myself and Dr. Meyer from 7/16/24  IMPRESSION: Posterior fusion hardware spanning L3-S1 with anterior fusion hardware at L3-4, L4-5 and L5-S1, similar to the prior study without acute hardware complication. Wedging deformity of the L2 vertebral body is stable.      A/P:  Raoul presents approximately 8mos s/p L3-sacral 1 ALIF and posterior minimally invasive instrumented fusion on 12/29/23 with Dr. Meyer. He is moving to the [facility] next month. Will plan to get a lumbar CT prior to him moving and call him to review. Discussed red flag signs/symptoms. He will call us if any new symptoms or concerns. He is in agreement.     Time start: 850a  Time end: 856a  I was in [place], [place]. The patient was in [place].     Marie Dickson PA-C  [facility] Neurosurgery  [address] [place] [address]  [phone]      Again, thank you for allowing me to participate in the care of your patient.        Sincerely,        MARIE DICKSON PA-C

## 2024-07-18 NOTE — PROGRESS NOTES
Neurosurgery telephone office visit    HPI:   Raoul presents approximately 8mos s/p L3-sacral 1 ALIF and posterior minimally invasive instrumented fusion on 12/29/23 with Dr. Meyer. Continues to do well wo significant back or leg symptoms. He has continued with rehab exercises at home. He is up to 1.5mi walking. He has continued biking, stretching. He uses tylenol as needed for pain. He wears an abdominal brace when riding his Moped. Denies incisional concerns.     PMH/SH/ reviewed     Exam: deferred as this was a phone visit    Imaging  Lumbar xrays reviewed by myself and Dr. Meyer from 7/16/24  IMPRESSION: Posterior fusion hardware spanning L3-S1 with anterior fusion hardware at L3-4, L4-5 and L5-S1, similar to the prior study without acute hardware complication. Wedging deformity of the L2 vertebral body is stable.      A/P:  Raoul presents approximately 8mos s/p L3-sacral 1 ALIF and posterior minimally invasive instrumented fusion on 12/29/23 with Dr. Meyer. He is moving to the [facility] next month. Will plan to get a lumbar CT prior to him moving and call him to review. Discussed red flag signs/symptoms. He will call us if any new symptoms or concerns. He is in agreement.     Time start: 850a  Time end: 856a  I was in [place], [place]. The patient was in [place].     Marie Dickson PA-C  [facility] Neurosurgery  [address] [place] [address]  [phone]

## 2025-01-18 ENCOUNTER — HEALTH MAINTENANCE LETTER (OUTPATIENT)
Age: 70
End: 2025-01-18

## 2025-07-30 ENCOUNTER — TELEPHONE (OUTPATIENT)
Dept: FAMILY MEDICINE | Facility: CLINIC | Age: 70
End: 2025-07-30
Payer: COMMERCIAL

## 2025-07-30 NOTE — TELEPHONE ENCOUNTER
Patient Quality Outreach    Patient is due for the following:   Physical Annual Wellness Visit    Action(s) Taken:   Patient is not an established [facility] patient per patient; does not want any further outreach. Patient is with [facility] Dr. Chung    Type of outreach:    Chart review performed, no outreach needed.    Questions for provider review:    None         Yeimy Knight  Chart routed to None.

## (undated) DEVICE — DRAPE IOBAN INCISE 23X17" 6650EZ

## (undated) DEVICE — DRSG ISLAND 4X4" SQUARE LF MSC3244

## (undated) DEVICE — CELL SAVER

## (undated) DEVICE — CATH TRAY FOLEY SURESTEP 16FR DRAIN BAG STATOCK A899916

## (undated) DEVICE — DRAPE C-ARMOR 5 SIDED 5523

## (undated) DEVICE — GLOVE UNDER INDICATOR PI SZ 6.5 LF 41665

## (undated) DEVICE — GLOVE BIOGEL PI ULTRATOUCH G SZ 6.5 42165

## (undated) DEVICE — DRSG PRIMAPORE 03 1/8X6" 66000318

## (undated) DEVICE — GLOVE BIOGEL PI ULTRATOUCH G SZ 8.0 42180

## (undated) DEVICE — SUTURE PDS 0 60IN CTX+ LOOPED PDP990G

## (undated) DEVICE — SUTURE VICRYL+ 2-0 18 CT1/CR VLT VCP839D

## (undated) DEVICE — DRAPE O ARM TUBE 9732722

## (undated) DEVICE — SU VICRYL+ 0 8-18 CT1/CR UND VCP840D

## (undated) DEVICE — DRAPE C-ARM 60X42" 1013

## (undated) DEVICE — SUTURE SILK 2-0 TIES 30IN SA85H

## (undated) DEVICE — DRAPE O ARM BAR MEDTRONIC 9733023

## (undated) DEVICE — CUST PACK ANTERIOR POSTERIOR FUSION SOP5BAPHEA

## (undated) DEVICE — ADH LIQUID MASTISOL TOPICAL VIAL 2-3ML 0523-48

## (undated) DEVICE — DRSG PRIMAPORE 02X3" 7133

## (undated) DEVICE — PREP DYNA-HEX 4% CHG SCRUB 4OZ BOTTLE MDS098710

## (undated) DEVICE — DRSG PRIMAPORE 04X11 3/4"

## (undated) DEVICE — PACK 9X6IN THRP HOT COLD CMPR  MED GEL 80104

## (undated) DEVICE — SU DERMABOND ADVANCED .7ML DNX12

## (undated) DEVICE — SUCTION TIP YANKAUER W/O VENT K86

## (undated) DEVICE — SUTURE VICRYL+ 2-0 CT-2 27" UND VCP269H

## (undated) DEVICE — ESU ELEC BLADE 6" COATED E1450-6

## (undated) DEVICE — NEEDLE SPINAL DISP 18GA X 3.5" QUINCKE 333350

## (undated) DEVICE — SUTURE VICRYL+ 3-0 18 SH/CR UND VCP864

## (undated) DEVICE — PITCHER STERILE 1000ML  SSK9004A

## (undated) DEVICE — ESU PENCIL SMOKE EVAC W/ROCKER SWITCH 0703-047-000

## (undated) DEVICE — SU MONOCRYL+ 4-0 18IN PS2 UND MCP496G

## (undated) DEVICE — GLOVE UNDER INDICATOR PI SZ 7.0 LF 41670

## (undated) DEVICE — A3 SUPPLIES- SEE NURSING INFO PAGE

## (undated) DEVICE — POSITIONER ARM CRADLE LAMINECTOMY DISP

## (undated) DEVICE — WRAP B-COOL TERI LUMBAR 08143380

## (undated) DEVICE — DRAPE SHEET REV FOLD 3/4 9349

## (undated) DEVICE — SOL ISOPROPYL RUBBING ALCOHOL USP 70% 4OZ HDX-20 I0020

## (undated) DEVICE — GOWN LG DISP 9515

## (undated) DEVICE — SUTURE VICRYL+ 2-0 CT-2 CR 8X18" VCP726D

## (undated) DEVICE — SUCTION MANIFOLD NEPTUNE 2 SYS 1 PORT 702-025-000

## (undated) DEVICE — CLIP APPLIER 11" MED LIGACLIP MCM20

## (undated) DEVICE — MARKER SPHERES PASSIVE MEDT PACK 5 8801075

## (undated) DEVICE — DRSG STERI STRIP 1/2X4" R1547

## (undated) DEVICE — NDL SPINAL 20GA 3.5" 405182

## (undated) DEVICE — GLOVE BIOGEL PI INDICATOR 8.0 LF 41680

## (undated) DEVICE — SOL WATER IRRIG 1000ML BOTTLE 2F7114

## (undated) DEVICE — ELECTRODE PATIENT RETURN ADULT L10 FT 2 PLATE CORD 0855C

## (undated) DEVICE — CUSHION INSERT LG PRONE VIEW JACKSON TABLE

## (undated) DEVICE — ESU ELEC BLADE 2.75" COATED/INSULATED E1455

## (undated) DEVICE — LIGACLIP LARGE AESCULAP O4120-1

## (undated) RX ORDER — FENTANYL CITRATE 50 UG/ML
INJECTION, SOLUTION INTRAMUSCULAR; INTRAVENOUS
Status: DISPENSED
Start: 2023-12-29

## (undated) RX ORDER — CEFAZOLIN SODIUM 1 G/3ML
INJECTION, POWDER, FOR SOLUTION INTRAMUSCULAR; INTRAVENOUS
Status: DISPENSED
Start: 2023-12-29

## (undated) RX ORDER — VASOPRESSIN IN 0.9 % NACL 2 UNIT/2ML
SYRINGE (ML) INTRAVENOUS
Status: DISPENSED
Start: 2023-12-29

## (undated) RX ORDER — CALCIUM CHLORIDE 100 MG/ML
INJECTION INTRAVENOUS; INTRAVENTRICULAR
Status: DISPENSED
Start: 2023-12-29

## (undated) RX ORDER — EPHEDRINE SULFATE 50 MG/ML
INJECTION, SOLUTION INTRAMUSCULAR; INTRAVENOUS; SUBCUTANEOUS
Status: DISPENSED
Start: 2023-12-29